# Patient Record
Sex: FEMALE | Race: WHITE | NOT HISPANIC OR LATINO | Employment: UNEMPLOYED | URBAN - METROPOLITAN AREA
[De-identification: names, ages, dates, MRNs, and addresses within clinical notes are randomized per-mention and may not be internally consistent; named-entity substitution may affect disease eponyms.]

---

## 2017-05-23 PROCEDURE — 88305 TISSUE EXAM BY PATHOLOGIST: CPT | Performed by: OBSTETRICS & GYNECOLOGY

## 2017-05-23 PROCEDURE — 88342 IMHCHEM/IMCYTCHM 1ST ANTB: CPT | Performed by: OBSTETRICS & GYNECOLOGY

## 2017-05-24 ENCOUNTER — LAB REQUISITION (OUTPATIENT)
Dept: LAB | Facility: HOSPITAL | Age: 32
End: 2017-05-24
Payer: COMMERCIAL

## 2017-05-24 DIAGNOSIS — R87.610 ATYPICAL SQUAMOUS CELLS OF UNDETERMINED SIGNIFICANCE ON CYTOLOGIC SMEAR OF CERVIX (ASC-US): ICD-10-CM

## 2017-06-05 RX ORDER — SACCHAROMYCES BOULARDII 250 MG
250 CAPSULE ORAL DAILY
Status: ON HOLD | COMMUNITY
End: 2017-06-08 | Stop reason: ALTCHOICE

## 2017-06-05 RX ORDER — IBUPROFEN 200 MG
200 TABLET ORAL EVERY 6 HOURS PRN
COMMUNITY
End: 2018-09-21 | Stop reason: ALTCHOICE

## 2017-06-07 ENCOUNTER — ANESTHESIA EVENT (OUTPATIENT)
Dept: PERIOP | Facility: HOSPITAL | Age: 32
End: 2017-06-07
Payer: COMMERCIAL

## 2017-06-08 ENCOUNTER — ANESTHESIA (OUTPATIENT)
Dept: PERIOP | Facility: HOSPITAL | Age: 32
End: 2017-06-08
Payer: COMMERCIAL

## 2017-06-08 ENCOUNTER — HOSPITAL ENCOUNTER (OUTPATIENT)
Facility: HOSPITAL | Age: 32
Setting detail: OUTPATIENT SURGERY
Discharge: HOME | End: 2017-06-08
Attending: OBSTETRICS & GYNECOLOGY | Admitting: OBSTETRICS & GYNECOLOGY
Payer: COMMERCIAL

## 2017-06-08 VITALS
SYSTOLIC BLOOD PRESSURE: 106 MMHG | HEIGHT: 68 IN | DIASTOLIC BLOOD PRESSURE: 56 MMHG | OXYGEN SATURATION: 100 % | WEIGHT: 137 LBS | RESPIRATION RATE: 16 BRPM | BODY MASS INDEX: 20.76 KG/M2 | HEART RATE: 61 BPM | TEMPERATURE: 97.8 F

## 2017-06-08 DIAGNOSIS — D06.9 CARCINOMA IN SITU OF CERVIX: ICD-10-CM

## 2017-06-08 PROBLEM — N87.1 DYSPLASIA OF CERVIX, HIGH GRADE CIN 2: Status: ACTIVE | Noted: 2017-06-08

## 2017-06-08 LAB
ERYTHROCYTE [DISTWIDTH] IN BLOOD BY AUTOMATED COUNT: 12.8 % (ref 11.6–15.1)
EXT PREGNANCY TEST URINE: NORMAL
HCT VFR BLD AUTO: 37.4 % (ref 34.8–46.1)
HGB BLD-MCNC: 13 G/DL (ref 11.5–15.4)
MCH RBC QN AUTO: 32.8 PG (ref 26.8–34.3)
MCHC RBC AUTO-ENTMCNC: 34.8 G/DL (ref 31.4–37.4)
MCV RBC AUTO: 94 FL (ref 82–98)
PLATELET # BLD AUTO: 212 THOUSANDS/UL (ref 149–390)
PMV BLD AUTO: 11.1 FL (ref 8.9–12.7)
RBC # BLD AUTO: 3.96 MILLION/UL (ref 3.81–5.12)
WBC # BLD AUTO: 7.19 THOUSAND/UL (ref 4.31–10.16)

## 2017-06-08 PROCEDURE — 81025 URINE PREGNANCY TEST: CPT | Performed by: ANESTHESIOLOGY

## 2017-06-08 PROCEDURE — 88307 TISSUE EXAM BY PATHOLOGIST: CPT | Performed by: OBSTETRICS & GYNECOLOGY

## 2017-06-08 PROCEDURE — 85027 COMPLETE CBC AUTOMATED: CPT | Performed by: OBSTETRICS & GYNECOLOGY

## 2017-06-08 PROCEDURE — 88344 IMHCHEM/IMCYTCHM EA MLT ANTB: CPT | Performed by: OBSTETRICS & GYNECOLOGY

## 2017-06-08 PROCEDURE — 88342 IMHCHEM/IMCYTCHM 1ST ANTB: CPT | Performed by: OBSTETRICS & GYNECOLOGY

## 2017-06-08 RX ORDER — ONDANSETRON 2 MG/ML
INJECTION INTRAMUSCULAR; INTRAVENOUS AS NEEDED
Status: DISCONTINUED | OUTPATIENT
Start: 2017-06-08 | End: 2017-06-08 | Stop reason: SURG

## 2017-06-08 RX ORDER — FERRIC SUBSULFATE 0.21 G/G
LIQUID TOPICAL AS NEEDED
Status: DISCONTINUED | OUTPATIENT
Start: 2017-06-08 | End: 2017-06-08 | Stop reason: HOSPADM

## 2017-06-08 RX ORDER — SODIUM CHLORIDE, SODIUM LACTATE, POTASSIUM CHLORIDE, CALCIUM CHLORIDE 600; 310; 30; 20 MG/100ML; MG/100ML; MG/100ML; MG/100ML
125 INJECTION, SOLUTION INTRAVENOUS CONTINUOUS
Status: DISCONTINUED | OUTPATIENT
Start: 2017-06-08 | End: 2017-06-08 | Stop reason: HOSPADM

## 2017-06-08 RX ORDER — OXYCODONE HYDROCHLORIDE AND ACETAMINOPHEN 5; 325 MG/1; MG/1
1 TABLET ORAL EVERY 4 HOURS PRN
Status: DISCONTINUED | OUTPATIENT
Start: 2017-06-08 | End: 2017-06-08 | Stop reason: HOSPADM

## 2017-06-08 RX ORDER — IODINE SOLUTION STRONG 5% (LUGOL'S) 5 %
SOLUTION ORAL AS NEEDED
Status: DISCONTINUED | OUTPATIENT
Start: 2017-06-08 | End: 2017-06-08 | Stop reason: HOSPADM

## 2017-06-08 RX ORDER — PROPOFOL 10 MG/ML
INJECTION, EMULSION INTRAVENOUS AS NEEDED
Status: DISCONTINUED | OUTPATIENT
Start: 2017-06-08 | End: 2017-06-08 | Stop reason: SURG

## 2017-06-08 RX ORDER — MEPERIDINE HYDROCHLORIDE 50 MG/ML
12.5 INJECTION INTRAMUSCULAR; INTRAVENOUS; SUBCUTANEOUS AS NEEDED
Status: DISCONTINUED | OUTPATIENT
Start: 2017-06-08 | End: 2017-06-08 | Stop reason: HOSPADM

## 2017-06-08 RX ORDER — IBUPROFEN 600 MG/1
600 TABLET ORAL EVERY 6 HOURS PRN
Status: DISCONTINUED | OUTPATIENT
Start: 2017-06-08 | End: 2017-06-08 | Stop reason: HOSPADM

## 2017-06-08 RX ORDER — FENTANYL CITRATE 50 UG/ML
INJECTION, SOLUTION INTRAMUSCULAR; INTRAVENOUS AS NEEDED
Status: DISCONTINUED | OUTPATIENT
Start: 2017-06-08 | End: 2017-06-08 | Stop reason: SURG

## 2017-06-08 RX ORDER — MIDAZOLAM HYDROCHLORIDE 1 MG/ML
INJECTION INTRAMUSCULAR; INTRAVENOUS AS NEEDED
Status: DISCONTINUED | OUTPATIENT
Start: 2017-06-08 | End: 2017-06-08 | Stop reason: SURG

## 2017-06-08 RX ORDER — FENTANYL CITRATE/PF 50 MCG/ML
50 SYRINGE (ML) INJECTION
Status: DISCONTINUED | OUTPATIENT
Start: 2017-06-08 | End: 2017-06-08 | Stop reason: HOSPADM

## 2017-06-08 RX ORDER — SODIUM CHLORIDE 9 MG/ML
125 INJECTION, SOLUTION INTRAVENOUS CONTINUOUS
Status: DISCONTINUED | OUTPATIENT
Start: 2017-06-08 | End: 2017-06-08 | Stop reason: HOSPADM

## 2017-06-08 RX ORDER — ONDANSETRON 2 MG/ML
4 INJECTION INTRAMUSCULAR; INTRAVENOUS EVERY 6 HOURS PRN
Status: DISCONTINUED | OUTPATIENT
Start: 2017-06-08 | End: 2017-06-08 | Stop reason: HOSPADM

## 2017-06-08 RX ORDER — ALBUTEROL SULFATE 2.5 MG/3ML
2.5 SOLUTION RESPIRATORY (INHALATION) ONCE AS NEEDED
Status: DISCONTINUED | OUTPATIENT
Start: 2017-06-08 | End: 2017-06-08 | Stop reason: HOSPADM

## 2017-06-08 RX ORDER — OXYCODONE HYDROCHLORIDE AND ACETAMINOPHEN 5; 325 MG/1; MG/1
2 TABLET ORAL EVERY 4 HOURS PRN
Status: DISCONTINUED | OUTPATIENT
Start: 2017-06-08 | End: 2017-06-08 | Stop reason: HOSPADM

## 2017-06-08 RX ADMIN — SODIUM CHLORIDE 125 ML/HR: 0.9 INJECTION, SOLUTION INTRAVENOUS at 09:12

## 2017-06-08 RX ADMIN — ONDANSETRON HYDROCHLORIDE 4 MG: 2 INJECTION, SOLUTION INTRAVENOUS at 08:32

## 2017-06-08 RX ADMIN — FENTANYL CITRATE 50 MCG: 50 INJECTION, SOLUTION INTRAMUSCULAR; INTRAVENOUS at 08:25

## 2017-06-08 RX ADMIN — SODIUM CHLORIDE 125 ML/HR: 0.9 INJECTION, SOLUTION INTRAVENOUS at 07:15

## 2017-06-08 RX ADMIN — PROPOFOL 200 MG: 10 INJECTION, EMULSION INTRAVENOUS at 08:12

## 2017-06-08 RX ADMIN — FENTANYL CITRATE 50 MCG: 50 INJECTION, SOLUTION INTRAMUSCULAR; INTRAVENOUS at 08:12

## 2017-06-08 RX ADMIN — MIDAZOLAM HYDROCHLORIDE 2 MG: 1 INJECTION, SOLUTION INTRAMUSCULAR; INTRAVENOUS at 08:07

## 2017-06-20 ENCOUNTER — APPOINTMENT (OUTPATIENT)
Dept: LAB | Age: 32
End: 2017-06-20
Payer: COMMERCIAL

## 2017-06-20 ENCOUNTER — TRANSCRIBE ORDERS (OUTPATIENT)
Dept: ADMINISTRATIVE | Age: 32
End: 2017-06-20

## 2017-06-20 DIAGNOSIS — Z32.01 PREGNANCY EXAMINATION OR TEST, POSITIVE RESULT: ICD-10-CM

## 2017-06-20 DIAGNOSIS — Z32.01 PREGNANCY EXAMINATION OR TEST, POSITIVE RESULT: Primary | ICD-10-CM

## 2017-06-20 LAB — B-HCG SERPL-ACNC: 42 MIU/ML

## 2017-06-20 PROCEDURE — 86900 BLOOD TYPING SEROLOGIC ABO: CPT | Performed by: OBSTETRICS & GYNECOLOGY

## 2017-06-20 PROCEDURE — 36415 COLL VENOUS BLD VENIPUNCTURE: CPT | Performed by: OBSTETRICS & GYNECOLOGY

## 2017-06-20 PROCEDURE — 86901 BLOOD TYPING SEROLOGIC RH(D): CPT | Performed by: OBSTETRICS & GYNECOLOGY

## 2017-06-20 PROCEDURE — 84702 CHORIONIC GONADOTROPIN TEST: CPT | Performed by: OBSTETRICS & GYNECOLOGY

## 2017-06-21 LAB
ABO GROUP BLD: NORMAL
RH BLD: NEGATIVE

## 2017-06-23 ENCOUNTER — TRANSCRIBE ORDERS (OUTPATIENT)
Dept: ADMINISTRATIVE | Age: 32
End: 2017-06-23

## 2017-06-23 ENCOUNTER — APPOINTMENT (OUTPATIENT)
Dept: LAB | Age: 32
End: 2017-06-23
Payer: COMMERCIAL

## 2017-06-23 DIAGNOSIS — Z32.01 PREGNANCY EXAMINATION OR TEST, POSITIVE RESULT: ICD-10-CM

## 2017-06-23 DIAGNOSIS — Z32.01 PREGNANCY EXAMINATION OR TEST, POSITIVE RESULT: Primary | ICD-10-CM

## 2017-06-23 LAB — B-HCG SERPL-ACNC: 235 MIU/ML

## 2017-06-23 PROCEDURE — 36415 COLL VENOUS BLD VENIPUNCTURE: CPT

## 2017-06-23 PROCEDURE — 84702 CHORIONIC GONADOTROPIN TEST: CPT

## 2017-06-29 ENCOUNTER — APPOINTMENT (OUTPATIENT)
Dept: LAB | Age: 32
End: 2017-06-29
Payer: COMMERCIAL

## 2017-06-29 ENCOUNTER — TRANSCRIBE ORDERS (OUTPATIENT)
Dept: ADMINISTRATIVE | Age: 32
End: 2017-06-29

## 2017-06-29 DIAGNOSIS — Z32.01 PREGNANCY EXAMINATION OR TEST, POSITIVE RESULT: ICD-10-CM

## 2017-06-29 DIAGNOSIS — Z32.01 PREGNANCY EXAMINATION OR TEST, POSITIVE RESULT: Primary | ICD-10-CM

## 2017-06-29 LAB — B-HCG SERPL-ACNC: 3357 MIU/ML

## 2017-06-29 PROCEDURE — 84702 CHORIONIC GONADOTROPIN TEST: CPT

## 2017-06-29 PROCEDURE — 36415 COLL VENOUS BLD VENIPUNCTURE: CPT

## 2017-07-19 ENCOUNTER — GENERIC CONVERSION - ENCOUNTER (OUTPATIENT)
Dept: OTHER | Facility: OTHER | Age: 32
End: 2017-07-19

## 2017-07-27 ENCOUNTER — TRANSCRIBE ORDERS (OUTPATIENT)
Dept: ADMINISTRATIVE | Age: 32
End: 2017-07-27

## 2017-07-27 ENCOUNTER — APPOINTMENT (OUTPATIENT)
Dept: LAB | Age: 32
End: 2017-07-27
Payer: COMMERCIAL

## 2017-07-27 DIAGNOSIS — Z34.91 ENCOUNTER FOR PREGNANCY RELATED EXAMINATION IN FIRST TRIMESTER: ICD-10-CM

## 2017-07-27 DIAGNOSIS — Z34.91 ENCOUNTER FOR PREGNANCY RELATED EXAMINATION IN FIRST TRIMESTER: Primary | ICD-10-CM

## 2017-07-27 LAB
BACTERIA UR QL AUTO: ABNORMAL /HPF
BASOPHILS # BLD AUTO: 0.03 THOUSANDS/ΜL (ref 0–0.1)
BASOPHILS NFR BLD AUTO: 0 % (ref 0–1)
BILIRUB UR QL STRIP: NEGATIVE
CAOX CRY URNS QL MICRO: ABNORMAL /HPF
CLARITY UR: CLEAR
COLOR UR: YELLOW
EOSINOPHIL # BLD AUTO: 0.06 THOUSAND/ΜL (ref 0–0.61)
EOSINOPHIL NFR BLD AUTO: 1 % (ref 0–6)
ERYTHROCYTE [DISTWIDTH] IN BLOOD BY AUTOMATED COUNT: 12.5 % (ref 11.6–15.1)
GLUCOSE SERPL-MCNC: 76 MG/DL (ref 65–140)
GLUCOSE UR STRIP-MCNC: NEGATIVE MG/DL
HCT VFR BLD AUTO: 36.2 % (ref 34.8–46.1)
HGB BLD-MCNC: 12.6 G/DL (ref 11.5–15.4)
HGB UR QL STRIP.AUTO: NEGATIVE
KETONES UR STRIP-MCNC: NEGATIVE MG/DL
LEUKOCYTE ESTERASE UR QL STRIP: ABNORMAL
LYMPHOCYTES # BLD AUTO: 2.41 THOUSANDS/ΜL (ref 0.6–4.47)
LYMPHOCYTES NFR BLD AUTO: 22 % (ref 14–44)
MCH RBC QN AUTO: 31.7 PG (ref 26.8–34.3)
MCHC RBC AUTO-ENTMCNC: 34.8 G/DL (ref 31.4–37.4)
MCV RBC AUTO: 91 FL (ref 82–98)
MONOCYTES # BLD AUTO: 0.69 THOUSAND/ΜL (ref 0.17–1.22)
MONOCYTES NFR BLD AUTO: 6 % (ref 4–12)
NEUTROPHILS # BLD AUTO: 8.01 THOUSANDS/ΜL (ref 1.85–7.62)
NEUTS SEG NFR BLD AUTO: 71 % (ref 43–75)
NITRITE UR QL STRIP: NEGATIVE
NON-SQ EPI CELLS URNS QL MICRO: ABNORMAL /HPF
NRBC BLD AUTO-RTO: 0 /100 WBCS
PH UR STRIP.AUTO: 6.5 [PH] (ref 4.5–8)
PLATELET # BLD AUTO: 238 THOUSANDS/UL (ref 149–390)
PMV BLD AUTO: 11.1 FL (ref 8.9–12.7)
PROT UR STRIP-MCNC: NEGATIVE MG/DL
RBC # BLD AUTO: 3.97 MILLION/UL (ref 3.81–5.12)
RBC #/AREA URNS AUTO: ABNORMAL /HPF
SP GR UR STRIP.AUTO: 1.02 (ref 1–1.03)
UROBILINOGEN UR QL STRIP.AUTO: 1 E.U./DL
WBC # BLD AUTO: 11.21 THOUSAND/UL (ref 4.31–10.16)
WBC #/AREA URNS AUTO: ABNORMAL /HPF

## 2017-07-27 PROCEDURE — 80081 OBSTETRIC PANEL INC HIV TSTG: CPT

## 2017-07-27 PROCEDURE — 81001 URINALYSIS AUTO W/SCOPE: CPT | Performed by: OBSTETRICS & GYNECOLOGY

## 2017-07-27 PROCEDURE — 82947 ASSAY GLUCOSE BLOOD QUANT: CPT | Performed by: OBSTETRICS & GYNECOLOGY

## 2017-07-27 PROCEDURE — 36415 COLL VENOUS BLD VENIPUNCTURE: CPT

## 2017-07-27 PROCEDURE — 86787 VARICELLA-ZOSTER ANTIBODY: CPT

## 2017-07-27 PROCEDURE — 86870 RBC ANTIBODY IDENTIFICATION: CPT | Performed by: OBSTETRICS & GYNECOLOGY

## 2017-07-28 ENCOUNTER — LAB REQUISITION (OUTPATIENT)
Dept: LAB | Facility: HOSPITAL | Age: 32
End: 2017-07-28
Payer: COMMERCIAL

## 2017-07-28 DIAGNOSIS — Z34.81 ENCOUNTER FOR SUPERVISION OF OTHER NORMAL PREGNANCY, FIRST TRIMESTER: ICD-10-CM

## 2017-07-28 LAB
ABO GROUP BLD: NORMAL
BLD GP AB SCN SERPL QL: POSITIVE
BLOOD GROUP ANTIBODIES SERPL: NORMAL
HBV SURFACE AG SER QL: NORMAL
HIV 1+2 AB+HIV1 P24 AG SERPL QL IA: NORMAL
RH BLD: NEGATIVE
RPR SER QL: NORMAL
RUBV IGG SERPL IA-ACNC: 91 IU/ML
SPECIMEN EXPIRATION DATE: NORMAL

## 2017-08-01 LAB — VZV IGG SER IA-ACNC: NORMAL

## 2017-08-25 ENCOUNTER — GENERIC CONVERSION - ENCOUNTER (OUTPATIENT)
Dept: OTHER | Facility: OTHER | Age: 32
End: 2017-08-25

## 2017-08-30 ENCOUNTER — ALLSCRIPTS OFFICE VISIT (OUTPATIENT)
Dept: PERINATAL CARE | Facility: CLINIC | Age: 32
End: 2017-08-30
Payer: COMMERCIAL

## 2017-08-30 PROCEDURE — 99204 OFFICE O/P NEW MOD 45 MIN: CPT | Performed by: GENETIC COUNSELOR, MS

## 2017-08-31 ENCOUNTER — GENERIC CONVERSION - ENCOUNTER (OUTPATIENT)
Dept: OTHER | Facility: OTHER | Age: 32
End: 2017-08-31

## 2017-09-08 ENCOUNTER — ALLSCRIPTS OFFICE VISIT (OUTPATIENT)
Dept: PERINATAL CARE | Facility: CLINIC | Age: 32
End: 2017-09-08
Payer: COMMERCIAL

## 2017-09-08 ENCOUNTER — GENERIC CONVERSION - ENCOUNTER (OUTPATIENT)
Dept: OTHER | Facility: OTHER | Age: 32
End: 2017-09-08

## 2017-09-08 PROCEDURE — 59000 AMNIOCENTESIS DIAGNOSTIC: CPT | Performed by: OBSTETRICS & GYNECOLOGY

## 2017-09-08 PROCEDURE — 76805 OB US >/= 14 WKS SNGL FETUS: CPT | Performed by: OBSTETRICS & GYNECOLOGY

## 2017-09-08 PROCEDURE — 76946 ECHO GUIDE FOR AMNIOCENTESIS: CPT | Performed by: OBSTETRICS & GYNECOLOGY

## 2017-09-19 ENCOUNTER — TRANSCRIBE ORDERS (OUTPATIENT)
Dept: ADMINISTRATIVE | Age: 32
End: 2017-09-19

## 2017-09-19 ENCOUNTER — APPOINTMENT (OUTPATIENT)
Dept: LAB | Age: 32
End: 2017-09-19
Payer: COMMERCIAL

## 2017-09-19 DIAGNOSIS — O09.92 SUPERVISION OF HIGH RISK PREGNANCY IN SECOND TRIMESTER: ICD-10-CM

## 2017-09-19 DIAGNOSIS — O09.92 SUPERVISION OF HIGH RISK PREGNANCY IN SECOND TRIMESTER: Primary | ICD-10-CM

## 2017-09-19 DIAGNOSIS — Z3A.17 17 WEEKS GESTATION OF PREGNANCY: ICD-10-CM

## 2017-09-19 PROCEDURE — 36415 COLL VENOUS BLD VENIPUNCTURE: CPT | Performed by: OBSTETRICS & GYNECOLOGY

## 2017-09-19 PROCEDURE — 84702 CHORIONIC GONADOTROPIN TEST: CPT | Performed by: OBSTETRICS & GYNECOLOGY

## 2017-09-19 PROCEDURE — 82105 ALPHA-FETOPROTEIN SERUM: CPT | Performed by: OBSTETRICS & GYNECOLOGY

## 2017-09-19 PROCEDURE — 86336 INHIBIN A: CPT | Performed by: OBSTETRICS & GYNECOLOGY

## 2017-09-19 PROCEDURE — 82677 ASSAY OF ESTRIOL: CPT | Performed by: OBSTETRICS & GYNECOLOGY

## 2017-09-21 ENCOUNTER — ALLSCRIPTS OFFICE VISIT (OUTPATIENT)
Dept: PERINATAL CARE | Facility: CLINIC | Age: 32
End: 2017-09-21
Payer: COMMERCIAL

## 2017-09-21 ENCOUNTER — GENERIC CONVERSION - ENCOUNTER (OUTPATIENT)
Dept: OTHER | Facility: OTHER | Age: 32
End: 2017-09-21

## 2017-09-21 PROCEDURE — 76946 ECHO GUIDE FOR AMNIOCENTESIS: CPT | Performed by: OBSTETRICS & GYNECOLOGY

## 2017-09-21 PROCEDURE — 59000 AMNIOCENTESIS DIAGNOSTIC: CPT | Performed by: OBSTETRICS & GYNECOLOGY

## 2017-09-22 LAB
2ND TRIMESTER 4 SCREEN SERPL-IMP: NORMAL
AFP ADJ MOM SERPL: 0.74
AFP ADJ MOM SERPL: 0.79
AFP INTERP AMN-IMP: NORMAL
AFP INTERP SERPL-IMP: NORMAL
AFP INTERP SERPL-IMP: NORMAL
AFP SERPL-MCNC: 30.3 NG/ML
AFP SERPL-MCNC: 32.3 NG/ML
AGE AT DELIVERY: 31.3 YEARS
AGE AT DELIVERY: 31.3 YEARS
FET TS 18 RISK FROM MAT AGE: NORMAL
FET TS 21 RISK FROM MAT AGE: 587
GA METHOD: NORMAL
GA METHOD: NORMAL
GA: 17.6 WEEKS
GA: 17.6 WEEKS
HCG ADJ MOM SERPL: 1.29
HCG SERPL-ACNC: NORMAL MIU/ML
IDDM PATIENT QL: NO
IDDM PATIENT QL: NO
INHIBIN A ADJ MOM SERPL: 1.02
INHIBIN A SERPL-MCNC: 178.27 PG/ML
KARYOTYP BLD/T: NORMAL
MULTIPLE PREGNANCY: NO
MULTIPLE PREGNANCY: NO
NEURAL TUBE DEFECT RISK FETUS: NORMAL %
NEURAL TUBE DEFECT RISK FETUS: NORMAL %
SERVICE CMNT-IMP: NORMAL
TS 18 RISK FETUS: NORMAL
TS 21 RISK FETUS: 1536
U ESTRIOL ADJ MOM SERPL: 0.97
U ESTRIOL SERPL-MCNC: 1.18 NG/ML

## 2017-09-27 ENCOUNTER — APPOINTMENT (OUTPATIENT)
Dept: LAB | Facility: HOSPITAL | Age: 32
End: 2017-09-27
Attending: OBSTETRICS & GYNECOLOGY
Payer: COMMERCIAL

## 2017-09-27 ENCOUNTER — TRANSCRIBE ORDERS (OUTPATIENT)
Dept: LAB | Facility: HOSPITAL | Age: 32
End: 2017-09-27

## 2017-09-27 ENCOUNTER — GENERIC CONVERSION - ENCOUNTER (OUTPATIENT)
Dept: OTHER | Facility: OTHER | Age: 32
End: 2017-09-27

## 2017-09-27 DIAGNOSIS — Z13.79 GENETIC SCREENING: ICD-10-CM

## 2017-09-27 DIAGNOSIS — Z13.79 GENETIC SCREENING: Primary | ICD-10-CM

## 2017-09-27 PROCEDURE — 36415 COLL VENOUS BLD VENIPUNCTURE: CPT

## 2017-10-11 ENCOUNTER — GENERIC CONVERSION - ENCOUNTER (OUTPATIENT)
Dept: OTHER | Facility: OTHER | Age: 32
End: 2017-10-11

## 2017-10-11 ENCOUNTER — APPOINTMENT (OUTPATIENT)
Dept: PERINATAL CARE | Facility: CLINIC | Age: 32
End: 2017-10-11
Payer: COMMERCIAL

## 2017-10-11 PROCEDURE — 76811 OB US DETAILED SNGL FETUS: CPT | Performed by: OBSTETRICS & GYNECOLOGY

## 2017-10-11 PROCEDURE — 76817 TRANSVAGINAL US OBSTETRIC: CPT | Performed by: OBSTETRICS & GYNECOLOGY

## 2017-11-01 ENCOUNTER — GENERIC CONVERSION - ENCOUNTER (OUTPATIENT)
Dept: OTHER | Facility: OTHER | Age: 32
End: 2017-11-01

## 2017-11-01 ENCOUNTER — APPOINTMENT (OUTPATIENT)
Dept: PERINATAL CARE | Facility: CLINIC | Age: 32
End: 2017-11-01
Payer: COMMERCIAL

## 2017-11-01 PROCEDURE — 93325 DOPPLER ECHO COLOR FLOW MAPG: CPT | Performed by: OBSTETRICS & GYNECOLOGY

## 2017-11-01 PROCEDURE — 76827 ECHO EXAM OF FETAL HEART: CPT | Performed by: OBSTETRICS & GYNECOLOGY

## 2017-11-01 PROCEDURE — 76825 ECHO EXAM OF FETAL HEART: CPT | Performed by: OBSTETRICS & GYNECOLOGY

## 2017-12-05 ENCOUNTER — TRANSCRIBE ORDERS (OUTPATIENT)
Dept: ADMINISTRATIVE | Age: 32
End: 2017-12-05

## 2017-12-05 ENCOUNTER — APPOINTMENT (OUTPATIENT)
Dept: LAB | Age: 32
End: 2017-12-05
Payer: COMMERCIAL

## 2017-12-05 DIAGNOSIS — O09.90 HIGH RISK PREGNANCY, ANTEPARTUM: Primary | ICD-10-CM

## 2017-12-05 DIAGNOSIS — O09.90 HIGH RISK PREGNANCY, ANTEPARTUM: ICD-10-CM

## 2017-12-05 DIAGNOSIS — Z67.91 RHD NEGATIVE: ICD-10-CM

## 2017-12-05 LAB
BASOPHILS # BLD AUTO: 0.02 THOUSANDS/ΜL (ref 0–0.1)
BASOPHILS NFR BLD AUTO: 0 % (ref 0–1)
BLD GP AB SCN SERPL QL: NEGATIVE
EOSINOPHIL # BLD AUTO: 0.06 THOUSAND/ΜL (ref 0–0.61)
EOSINOPHIL NFR BLD AUTO: 1 % (ref 0–6)
ERYTHROCYTE [DISTWIDTH] IN BLOOD BY AUTOMATED COUNT: 12.9 % (ref 11.6–15.1)
GLUCOSE 1H P 50 G GLC PO SERPL-MCNC: 103 MG/DL
HCT VFR BLD AUTO: 32.2 % (ref 34.8–46.1)
HGB BLD-MCNC: 11 G/DL (ref 11.5–15.4)
LYMPHOCYTES # BLD AUTO: 2.1 THOUSANDS/ΜL (ref 0.6–4.47)
LYMPHOCYTES NFR BLD AUTO: 20 % (ref 14–44)
MCH RBC QN AUTO: 32 PG (ref 26.8–34.3)
MCHC RBC AUTO-ENTMCNC: 34.2 G/DL (ref 31.4–37.4)
MCV RBC AUTO: 94 FL (ref 82–98)
MONOCYTES # BLD AUTO: 0.75 THOUSAND/ΜL (ref 0.17–1.22)
MONOCYTES NFR BLD AUTO: 7 % (ref 4–12)
NEUTROPHILS # BLD AUTO: 7.43 THOUSANDS/ΜL (ref 1.85–7.62)
NEUTS SEG NFR BLD AUTO: 72 % (ref 43–75)
NRBC BLD AUTO-RTO: 0 /100 WBCS
PLATELET # BLD AUTO: 252 THOUSANDS/UL (ref 149–390)
PMV BLD AUTO: 10.8 FL (ref 8.9–12.7)
RBC # BLD AUTO: 3.44 MILLION/UL (ref 3.81–5.12)
WBC # BLD AUTO: 10.41 THOUSAND/UL (ref 4.31–10.16)

## 2017-12-05 PROCEDURE — 36415 COLL VENOUS BLD VENIPUNCTURE: CPT

## 2017-12-05 PROCEDURE — 82950 GLUCOSE TEST: CPT

## 2017-12-05 PROCEDURE — 86850 RBC ANTIBODY SCREEN: CPT

## 2017-12-05 PROCEDURE — 86592 SYPHILIS TEST NON-TREP QUAL: CPT

## 2017-12-05 PROCEDURE — 85025 COMPLETE CBC W/AUTO DIFF WBC: CPT

## 2017-12-06 LAB — RPR SER QL: NORMAL

## 2018-01-09 NOTE — MISCELLANEOUS
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

## 2018-01-12 NOTE — PROGRESS NOTES
SEP 21 2017         RE: Rodríguez Ayon                                 To: [facility]   MR#: [id number]                                    [address]   : [address]  [facility] [address]: [id number]:ILDJHEMANT Mantilla, [address]   (Exam #: [id number])                           Fax: [phone]      The LMP of this 28year old,  G3, P1-0-1-* patient was MAY 19 2017, giving   her an ABDULLAHI of 2018 and a current gestational age of 12 weeks 6 days   by dates  A sonographic examination was performed on SEP 21 2017 using   real time equipment  The ultrasound examination was performed using   abdominal technique  The patient has a BMI of 22 3  Her blood pressure   today was 102/64  Cardiac motion was observed at 143 bpm       INDICATIONS      long  interval pregnancy   family history of fragile x   maternal tachycardia   history of maternal cardiac anomaly   prior LEEP      Exam Types      amniocentesis      RESULTS      Fetus # 1 of 1   Variable presentation   Placenta Location = Anterior   No placenta previa      AMNIOTIC FLUID         Largest Vertical Pocket = 6 0 cm   Amniotic Fluid: Normal      PROCEDURES      Amniocentesis (Genetic) was performed by MELISSA Davis  under   ultrasound guidance  On the first attempt 30 cc of clear fluid were   obtained  The fluid was sent for AFP, Genetic and Other  The 20 gauge   needle did not traverse the placenta  There were no complications during   this procedure  Received two doses of rhogam thus far in pregnancy  Post procedure fetal heart movement was documented  The patient is Rh Negative, and Rhogam was not administered        IMPRESSION      Darden IUP   Variable presentation   Regular fetal heart rate of 143 bpm   Anterior placenta   No placenta previa   Successful amniocentesis      GENERAL COMMENT      On exam, the patient appears well, in no acute distress, and her abdomen   is nontender  Thank you for referring your patient to our Center for amniocentesis for   known maternal translocation and fragile X carrier status  The amniotic   fluid volume is normal   The placenta is anterior and appears   sonographically normal   Good fetal movement and tone are appreciated   today  After informed consent was obtained, amniocentesis was performed   without difficulty (see comments above)  Amniotic fluid was sent for   karyotype  Fetal heart motion was demonstrated pre and post procedure  Precautions were reviewed  The patient was informed of today's findings   and all of her questions were answered  The limitations of ultrasound   were reviewed with the patient, which she accepts  We discussed follow-up in detail and amylase currently scheduled to return   at 20 weeks for a detailed fetal anatomic evaluation as well as a fetal   echocardiogram        Thank you very much for allowing us to participate in the care of this   very nice patient  Should you have any questions, please do not hesitate   to contact our office  Please note, in addition to the time spent discussing the results of the   ultrasound, I spent approximately 10 minutes of face-to-face time with the   patient, greater than 50% of which was spent in counseling and the   coordination of care for this patient  Almyra Sandifer, R D M S Janean Mam, M D     Electronically signed 09/21/17 13:10

## 2018-01-13 VITALS
BODY MASS INDEX: 22.28 KG/M2 | SYSTOLIC BLOOD PRESSURE: 102 MMHG | HEIGHT: 68 IN | WEIGHT: 147 LBS | DIASTOLIC BLOOD PRESSURE: 64 MMHG

## 2018-01-13 NOTE — PROGRESS NOTES
2017         RE: Sheila Gay                                 To: [facility]   MR#: [id number]                                    [address]   : [address] [place] [address]: [id number]:INDER Mantilla, [address]   (Exam #: [id number])                           Fax: [phone]      The LMP of this 28year old,  G3, P1-0-1-0 patient was MAY 19 2017, giving   her an ABDULLAHI of 2018 and a current gestational age of 20 weeks 5 days   by dates  A sonographic examination was performed on 2017 using real   time equipment  The ultrasound examination was performed using abdominal   technique  The patient has a BMI of 23 6  Her blood pressure today was   109/56        Cardiac motion was observed at 140 bpm       INDICATIONS      long  interval pregnancy   family history of fragile x   maternal tachycardia   history of maternal cardiac anomaly      Exam Types      Fetal Echocardiogram      RESULTS      Fetus # 1 of 1   Vertex presentation   Placenta Location = Anterior   No placenta previa   Placenta Grade = I      AMNIOTIC FLUID         Largest Vertical Pocket = 4 3 cm   Amniotic Fluid: Normal      FETAL VESSELS                                     S/D   PI    RI    PSV   AEDV RF                                                    cm/s       Umbilical Artery           3 75  1 24  0 73        No   No       Middle Cerebral Artery     5 59  1 72  0 82  26 51 No      FETAL VESSELS                                    PVSys PVDia PASys  S/D   S/A   DVI   RF       Ductus Venosus:                                                No      FETAL ECHOCARDIOGRAPHY      Visceral/abdominal situs                Normal   4 chamber view apical                   Normal   4 chamber view subcostal                Normal   Atrial septum                           Normal   Ventricular septum                      Normal   LVOT Normal   RVOT                                    Normal   3 Vessel-trachea view                   Normal   3 Vessel view                           Normal   Short Axis ventricles                   Normal   Short axis outflows                     Normal   Aortic Arch                             Normal   Ductal Arch                             Normal   Superior vena cava                      Normal   Inferior vena cava                      Normal   Pulmonary veins                         Normal   Foramen ovale                           Normal   Mitral valve                            Normal   Tricuspid valve                         Normal   Aortic valve                            Normal   Pulmonary valve                         Normal   M-mode/rhythm                           Normal   Umbilical artery                        Normal   Ductus venosus                          Normal   Umbilical vein                          Normal      FETAL ECHO      The  atrial to ventricular ratio was one to one with a rate within the   normal range of 110-160 beats per minute  The views of at least 2   pulmonary veins and the flow across the fossa ovale is from right to left   which is normal  Pulsed Doppler demonstrated normal waveform features and   the flow velocities across the mitral 52 89 cm/sec , tricuspid 52 89   cm/sec, aortic 60 30 cm/sec, aortic arch 78 04  cm/sec, pulmonary 44 80   cm/sec and ductus arteriosus 63 53  cm/sec appear normal  The   cardiothoracic area appears normal at <33 % or less  Color doppler study   demonstrated unidirectional flow across the tricuspid and mitral valves   with no evidence of regurgitation  There is no evidence of any pericardial   effusion, pleural effusion, ascites or fetal arrhythmia  The myocardial   performance appeared adequate  Umbilical dopplers are normal for   gestational age  Middle cerebral artery Dopplers are normal for   gestational age        The ability to view cardiac structures is dependant upon the image   quality, which varies with fetal position, gestational age and maternal   habitus  For example,  ultrasound may not detect small septal   defects and minor valvular abnormalities  Other examples of difficult    diagnosis include post valvular stenosis, coarctation of the   aorta, persistent patency of the ductus arteriosus and fossa ovale and   anomalous pulmonary venous return  IMPRESSION:  Normal fetal echocardiogram       IMPRESSION      Darden IUP   Vertex presentation   Fetal heart rate of 140 bpm   Anterior placenta   No placenta previa      GENERAL COMMENT      On exam, the patient appears well, in no acute distress, and her abdomen   is nontender  Adrianne's fragile X testing on the amnio returned as negative  We discussed follow-up in detail and I recommend  return as   clinically indicated  DANISH Mora , MELISSA Lisa     Electronically signed 17 16:01

## 2018-01-13 NOTE — PROGRESS NOTES
OCT 11 2017         RE: Trell Mcclain                                 To: [facility]   MR#: [id number]                                    [address]   : [address], [address]   (Exam #: [id number])                           Fax: [phone]      The LMP of this 28year old,  G3, P1-0-1-0 patient was MAY 19 2017, giving   her an ABDULLAHI of 2018 and a current gestational age of 25 weeks 5 days   by dates  A sonographic examination was performed on OCT 11 2017 using   real time equipment  The ultrasound examination was performed using   abdominal & vaginal techniques  The patient has a BMI of 23 0  Her blood   pressure today was 120/60  Cardiac motion was observed at 148 bpm       INDICATIONS      long  interval pregnancy   family history of fragile x   maternal tachycardia   history of maternal cardiac anomaly   prior LEEP   fetal anatomical survey      Exam Types      Transvaginal   LEVEL II      RESULTS      Fetus # 1 of 1   Breech presentation   Fetal growth appeared normal   Placenta Location = Anterior   No placenta previa      MEASUREMENTS (* Included In Average GA)      AC              15 7 cm        20 weeks 4 days* (48%)   BPD              4 6 cm        19 weeks 6 days* (27%)   HC              17 7 cm        20 weeks 1 day * (30%)   Femur            3 4 cm        20 weeks 5 days* (43%)      Nuchal Fold      3 8 mm   NBL              5 9 mm      Humerus          3 2 cm        20 weeks 4 days  (47%)      Cerebellum       2 1 cm        20 weeks 3 days   Biorbit          3 3 cm        20 weeks 6 days   CisternaMagna    6 1 mm      HC/AC           1 12   FL/AC           0 21   FL/BPD          0 73   EFW (Ac/Fl/Hc)   364 grams - 0 lbs 13 oz      THE AVERAGE GESTATIONAL AGE is 20 weeks 2 days +/- 10 days        AMNIOTIC FLUID         Largest Vertical Pocket = 6 3 cm   Amniotic Fluid: Normal      CERVICAL EVALUATION      SUPINE      Cervical Length: 3 20 cm      OTHER TEST RESULTS           Funneling?: No             Dynamic Changes?: No        Resp  To TFP?: No      ANATOMY      Head                                    Normal   Face/Neck                               Normal   Th  Cav  Normal   Heart                                   Normal   Abd  Cav  Normal   Stomach                                 Normal   Right Kidney                            Normal   Left Kidney                             Normal   Bladder                                 Normal   Abd  Wall                               Normal   Spine                                   Normal   Extrems                                 Normal   Genitalia                               Normal   Placenta                                Normal   Umbl  Cord                              Normal   Uterus                                  Normal   PCI                                     Normal      ANATOMY DETAILS      Visualized Appearing Sonographically Normal:   HEAD: (Calvarium, BPD Level, Cavum, Lateral Ventricles, Choroid Plexus,   Cerebellum, Cisterna Magna);    FACE/NECK: (Neck, Nuchal Fold, Profile,   Orbits, Nose/Lips, Palate, Face);    TH  CAV  : (Lungs, Diaphragm); HEART: (Four Chamber View, Proximal Left Outflow, Proximal Right Outflow,   3VV, 3 Vessel Trachea, Short Axis of Greater Vessels, Ductal Arch, Aortic   Arch, Interventricular Septum, Interatrial Septum, IVC, SVC, Cardiac Axis,   Cardiac Position);    ABD  CAV : (Liver);    STOMACH, RIGHT KIDNEY, LEFT   KIDNEY, BLADDER, ABD  WALL, SPINE: (Cervical Spine, Thoracic Spine, Lumbar   Spine, Sacrum);    EXTREMS: (Lt Humerus, Rt Humerus, Lt Forearm, Rt   Forearm, Lt Hand, Rt Hand, Lt Femur, Rt Femur, Lt Low Leg, Rt Low Leg, Lt   Foot, Rt Foot);    GENITALIA, PLACENTA, UMBL   CORD, UTERUS, PCI      ADNEXA      The left ovary appeared normal and measured 3 0 x 3 5 x 1 8 cm with a   volume of 9 9 cc  The right ovary was not visualized  IMPRESSION      Darden IUP   20 weeks and 2 days by this ultrasound  (ABDULLAHI=FEB 26 2018)   Breech presentation   Fetal growth appeared normal   Normal anatomy survey   Regular fetal heart rate of 148 bpm   Anterior placenta   No placenta previa      GENERAL COMMENT      On exam today the patient appears well, in no acute distress, and denies   any complaints  Her abdomen is non-tender  Alayna Bridges underwent an amniocentesis with normal chromosomes and MicroArray  We continue to await the evaluation for fragile X which should be resulted   within the next week or so  The fetal anatomic survey is complete  There is no sonographic evidence   of fetal abnormalities at this time  Good fetal movement and tone are   seen  The amniotic fluid volume appears normal   The placenta is anterior   and it appears sonographically normal   A transvaginal ultrasound was   performed to assess the cervix, which was not seen well transabdominally  The cervical length was 3 2 centimeters, which is normal for the current   gestational age  There was no significant funneling or dynamic changes   appreciated  The patient was informed of today's findings and all of her   questions were answered  The limitations of ultrasound were reviewed with   the patient, which she accepts  We discussed follow-up in detail and Alayna Bridges is currently scheduled for a   fetal echocardiogram in 2-3 weeks  She was counseled regarding and declined to receive a flu vaccine today   but will consider it in the near future         Please note, in addition to the time spent discussing the results of the   ultrasound, I spent approximately 10 minutes of face-to-face time with the   patient, greater than 50% of which was spent in counseling and the   coordination of care for this patient        Thank you very much for allowing us to participate in the care of this   very nice patient  Should you have any questions, please do not hesitate   to contact our office  DANISH Ozuna M D     Electronically signed 10/12/17 09:07

## 2018-01-14 VITALS
SYSTOLIC BLOOD PRESSURE: 99 MMHG | HEIGHT: 68 IN | DIASTOLIC BLOOD PRESSURE: 49 MMHG | WEIGHT: 147 LBS | BODY MASS INDEX: 22.28 KG/M2

## 2018-01-15 NOTE — PROGRESS NOTES
Acute Care - Physical Therapy Treatment Note/Discharge  [facility]     Patient Name: Juila Blackmon  : 1937  MRN: [id number]  Today's Date: 2017  Onset of Illness/Injury or Date of Surgery Date: 17  Date of Referral to PT: 17  Referring Physician: Dr. Ramos    Admit Date: 2017    Visit Dx:    ICD-10-CM ICD-9-CM   1. Incomplete tear of right rotator cuff M75.111 840.4   2. Impaired functional mobility, balance, gait, and endurance Z74.09 V49.89   3. Impaired mobility and ADLs Z74.09 799.89   4. S/P arthroscopy of shoulder Z98.890 V45.89     Patient Active Problem List   Diagnosis   • Well woman exam with routine gynecological exam   • Incomplete rotator cuff tear   • Elevated blood pressure reading   • Chronic prescription opiate use   • Depression   • GERD (gastroesophageal reflux disease)   • Controlled type 2 diabetes mellitus without complication, without long-term current use of insulin   • Chronic constipation   • Chronic back pain       Physical Therapy Education     Title: PT OT SLP Therapies (Active)     Topic: Physical Therapy (Done)     Point: Mobility training (Done)    Learning Progress Summary    Learner Readiness Method Response Comment Documented by Status   Patient Acceptance RAYMOND CURRAN NR   17 0841 Done    Acceptance RAYMOND CURRAN NR Reviewed benefits of mobility  17 1330 Done   Family Acceptance RAYMOND CURRAN NR   17 0841 Done               Point: Home exercise program (Done)    Learning Progress Summary    Learner Readiness Method Response Comment Documented by Status   Patient Acceptance RAYMOND CURRAN NR   17 0841 Done   Family Acceptance RAYMOND CURRAN NR   17 0841 Done               Point: Body mechanics (Done)    Learning Progress Summary    Learner Readiness Method Response Comment Documented by Status   Patient Acceptance RAYMOND CURRAN NR   17 0841 Done    Acceptance RAYMOND CURRAN NR Reviewed benefits of mobility  17 1330 Done   Family Acceptance RAYMOND CURRAN  SEP 8 2017         RE: Juaquin Bo                                 To: [facility]   MR#: [id number]                                    [address]   : [id number] [place]: [phone]:NINFAARRONRUBINA Mantilla, [address]   (Exam #: [id number])                           Fax: [phone]      The LMP of this 28year old,  G3, P1-0-1-* patient was MAY 19 2017, giving   her an ABDULLAHI of 2018 and a current gestational age of 12 weeks 0 days   by dates  A sonographic examination was performed on SEP 8 2017 using real   time equipment  The ultrasound examination was performed using abdominal   technique  The patient has a BMI of 22 3  Her blood pressure today was   99/49  Multiple longitudinal and transverse sections revealed a howard   intrauterine pregnancy with the fetus in breech presentation  The placenta   is anterior in implantation, grade I in appearance, and there is no   placenta previa        Cardiac motion was observed at 146 bpm       INDICATIONS      early fetal anatomy and growth   long  interval pregnancy   family history of fragile x   maternal tachycardia   history of maternal cardiac anomaly   prior LEEP      Exam Types      Level I   amniocentesis      RESULTS      Fetus # 1 of 1   Breech presentation   Fetal growth appeared normal      MEASUREMENTS (* Included In Average GA)      AC              10 2 cm        15 weeks 6 days* (55%)   BPD              3 2 cm        16 weeks 1 day *   HC              12 4 cm        16 weeks 1 day *   Femur            1 6 cm        14 weeks 4 days*      Humerus          1 6 cm        14 weeks 4 days  (17%)   Radius           1 6 cm        16 weeks 4 days   Ulna             1 4 cm        14 weeks 6 days   Tibia            1 4 cm        14 weeks 6 days  (8%)   Fibula           1 4 cm        14 weeks 4 days      Cerebellum       1 4 cm        15 weeks 4 days      HC/AC 1 21   FL/AC           0 16   FL/BPD          0 50   EFW (Ac/Fl/Hc)   128 grams - 0 lbs 5 oz      THE AVERAGE GESTATIONAL AGE is 15 weeks 5 days +/- 7 days  ANATOMY SUMMARY      The fetal cranium appeared normal in shape  Choroid plexus cysts are not   present  The lateral ventricles were not dilated and the midline   structures were not deviated  The cerebellum and cisterna magna were   visualized and appeared normal  The nuchal fold is not abnormally   thickened, measured at  mm  The calvarium showed no evidence of defect,   scalloping of the parietal bones or abnormal shape  The cavum septum   pellucidum appears normal  The fetal face appears normal  There is no   evidence of facial clefting, hypotelorism, hypertelorism or micrognathia  A normal appearing nasal bone measuring  mm was identified in the sagittal   profile view  3-D views of the face appeared normal  Anatomy of the fetal   thorax appeared within normal limits  The fetal diaphragm appears intact  There were no intrathoracic masses noted or evidence of   pleural/pericardial effusion  The cardiac arch views appeared normal     There was no suspicion of tricuspid regurgitation  The 4 chamber view was   suboptimally visualized  The proximal left outflow was suboptimally   visualized  The proximal right outflow was suboptimally visualized  The   distal pulmonary artery to ductal arch was visualized  The distal aorta to   aortic arch was suboptimally visualized  The interventricular septum was   visualized  The interatrial septum was visualized  The inferior vena cava   was not evaluated  The superior vena cava was not evaluated  The cardiac   axis appeared normal  The cardiac position appeared normal  The abdominal   cavity appears normal  There is no evidence of fetal bowel obstruction or   abnormally echogenic bowel  Ascites is not present  The fetal stomach   appears normal in size and shape   The right kidney appears normal  There   is no NABOR HAN   11/09/17 0841 Done               Point: Precautions (Done)    Learning Progress Summary    Learner Readiness Method Response Comment Documented by Status   Patient Acceptance RAYMOND CURRAN NR   11/09/17 0841 Done    Acceptance RAYMOND CURRAN NR Reviewed benefits of mobility  11/08/17 1330 Done   Family Acceptance RAYMOND CURRAN NR   11/09/17 0841 Done                      User Key     Initials Effective Dates Name Provider Type Discipline     03/14/16 -  Florin Hidalgo, PT Physical Therapist PT                    IP PT Goals       11/09/17 0841 11/08/17 1332       Bed Mobility PT LTG    Bed Mobility PT LTG, Date Established  11/08/17  -     Bed Mobility PT LTG, Time to Achieve  5 days  -MJ     Bed Mobility PT LTG, Activity Type  supine to sit/sit to supine  -MJ     Bed Mobility PT LTG, [place] Level  contact guard assist  -MJ     Bed Mobility PT LTG, Date Goal Reviewed 11/09/17  - 11/08/17  -     Bed Mobility PT LTG, Outcome goal not met  -MJ goal ongoing  -MJ     Bed Mobility PT LTG, Reason Goal Not Met discharged from facility  -MJ      Transfer Training PT LTG    Transfer Training PT LTG, Date Established  11/08/17  -MJ     Transfer Training PT LTG, Time to Achieve  5 days  -MJ     Transfer Training PT LTG, Activity Type  sit to stand/stand to sit  -MJ     Transfer Training PT LTG, [place] Level  conditional independence  -MJ     Transfer Training PT LTG, Assist Device  cane, straight  -MJ     Transfer Training PT  LTG, Date Goal Reviewed 11/09/17  - 11/08/17  -MJ     Transfer Training PT LTG, Outcome goal not met  -MJ goal ongoing  -MJ     Transfer Training PT LTG, Reason Goal Not Met discharged from facility  -MJ      Gait Training PT LTG    Gait Training Goal PT LTG, Date Established  11/08/17  -MJ     Gait Training Goal PT LTG, Time to Achieve  5 days  -MJ     Gait Training Goal PT LTG, [place] Level  supervision required  -MJ     Gait Training Goal PT LTG, Assist Device  cane, straight  -MJ      Gait Training Goal PT LTG, Distance to Achieve  150 feet  -MJ     Gait Training Goal PT LTG, Date Goal Reviewed 11/09/17  -MJ 11/08/17  -MJ     Gait Training Goal PT LTG, Outcome goal not met  -MJ goal ongoing  -MJ     Gait Training Goal PT LTG, Reason Goal Not Met discharged from facility  -MJ      Stair Training PT LTG    Stair Training Goal PT LTG, Date Established  11/08/17  -MJ     Stair Training Goal PT LTG, Time to Achieve  5 days  -MJ     Stair Training Goal PT LTG, Number of Steps  4  -MJ     Stair Training Goal PT LTG, [place] Level  contact guard assist  -MJ     Stair Training Goal PT LTG, Assist Device  1 handrail  -MJ     Stair Training Goal PT LTG, Date Goal Reviewed 11/09/17  -MJ 11/08/17  -MJ     Stair Training Goal PT LTG, Outcome goal met  -MJ goal ongoing  -MJ       User Key  (r) = Recorded By, (t) = Taken By, (c) = Cosigned By    Initials Name Provider Type     Florin Hidalgo, PT Physical Therapist              Adult Rehabilitation Note       11/09/17 0820          Rehab Assessment/Intervention    Discipline physical therapist  -      Document Type therapy note (daily note);discharge summary  -      Subjective Information agree to therapy;complains of;pain  -MJ      Patient Effort, Rehab Treatment good  -      Symptoms Noted During/After Treatment fatigue  -MJ      Precautions/Limitations fall precautions;shoulder precautions;non-weight bearing status   interscalene nerve catehter  -MJ      Recorded by [MJ] Florin Hidalgo, LANDEN      Pain Assessment    Pain Assessment 0-10  -MJ      Pain Score 5  -MJ      Post Pain Score 5  -MJ      Pain Type Acute pain  -MJ      Pain Location Shoulder  -MJ      Pain Orientation Right  -MJ      Pain Intervention(s) Repositioned;Ambulation/increased activity  -MJ      Recorded by [HANH] Florin Hidalgo, PT      Cognitive Assessment/Intervention    Current Cognitive/Communication Assessment functional  -MJ      Orientation Status oriented x 4  -MJ      Follows  suspicion of pyelectasis  Renal cysts are not present  The   echogenicity of the kidney is normal  The left kidney appears normal     There is no suspicion of pyelectasis  The echogenicity of the kidney is   normal   renal cysts are not present  The fetal bladder appears normal in   size and shape  There is no suspicion of ureterocele  The abdominal wall   appears intact  A normal abdominal cord insertion is noted  The cervical   spine was visualized  The thoracic spine was visualized  The lumbar spine   was visualized  The sacrum was visualized  The left humerus appeared   normal  The right humerus appeared normal  The left forearm appeared   normal  The right forearm appeared normal  The left hand was visualized  The right hand was visualized  The left femur appeared normal  The right   femur appeared normal  The lower left leg appeared normal  The lower right   leg appeared normal  The left foot was visualized  The right foot was   visualized  The genitalia appeared normal  The placenta appears normal    There is no evidence of advanced placental maturation, placental   abruption, intervillous thrombosis, placental infarction or multiple   venous lakes  There is a 3 vessel cord with normal insertion site  A   normal amount of umbilical vascular coiling is noted  The uterine contour   appears normal  There is no suspicion of a uterine myoma  ADNEXA      The left ovary appeared normal and measured 2 9 x 2 2 x 2 2 cm with a   volume of 7 3 cc  The right ovary appeared normal and measured 2 5 x 1 7 x   2 0 cm with a volume of 4 4 cc  Both tubes appeared normal       PROCEDURES      Amniocentesis was performed by MELISSA Odell  under ultrasound   guidance  On the first attempt no fluid were obtained  The 22 gauge needle   did not traverse the placenta  There were complications during this   procedure  Significant tenting of membranes preventing entrance into amniotic cavity      Unsuccessful Commands/Answers Questions 100% of the time;able to follow multi-step instructions;needs cueing  -      Personal Safety mild impairment  -      Recorded by [MJ] Florin Hidalgo, PT      Mobility Assessment/Training    Extremity Weight-Bearing Status right upper extremity  -      Right Upper Extremity Weight-Bearing non weight-bearing  -MJ      Recorded by [MJ] Florin Hidalgo, PT      Bed Mobility, Assessment/Treatment    Bed Mobility, Assistive Device bed rails;head of bed elevated  -      Bed Mob, Supine to Sit, [place] not tested   Pt sitting EOB  -MJ      Bed Mob, Sit to Supine, [place] not tested   Pt sitting EOB  -MJ      Recorded by [MJ] Florin Hidalgo, PT      Transfer Assessment/Treatment    Transfers, Sit-Stand [place] contact guard assist;verbal cues required  -      Transfers, Stand-Sit [place] contact guard assist;verbal cues required  -      Transfer, Safety Issues step length decreased;weight-shifting ability decreased  -      Transfer, Impairments strength decreased;impaired balance;pain  -      Transfer, Comment Verbal cues for hand placement  -      Recorded by [MJ] Florin Hidalgo, PT      Gait Assessment/Treatment    Gait, [place] Level contact guard assist;verbal cues required  -      Gait, Assistive Device straight cane  -      Gait, Distance (Feet) 140  -      Gait, Gait Pattern Analysis swing-through gait  -      Gait, Gait Deviations melonie decreased;step length decreased;weight-shifting ability decreased  -      Gait, Safety Issues step length decreased;weight-shifting ability decreased  -      Gait, Impairments strength decreased;pain  -      Gait, Comment Pt demo step through gait pattern at slow pace. Verbal cues for sequencing with cane. Gait limited by fatigue  -      Recorded by [MJ] Florin Hidalgo, PT      Stairs Assessment/Treatment    Number of Stairs 5  -      Stairs, Handrail Location left side (ascending)  -      Stairs, [place]  amniocentesis  Post procedure fetal heart movement was documented  The patient is Rh Negative, and Rhogam was administered  AMNIOTIC FLUID         Largest Vertical Pocket = 4 9 cm   Amniotic Fluid: Normal      IMPRESSION      [facility] IUP   15 weeks and 5 days by this ultrasound  (ABDULLAHI=FEB 25 2018)   Breech presentation   Fetal growth appeared normal   Regular fetal heart rate of 146 bpm   Anterior placenta   No placenta previa   Unsuccessful amniocentesis      GENERAL Henrique Rocha presented today for an amniocentesis for the 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  She met with   Ld Herrera from Genetic counseling  Please see separate genetic   counseling note  Unfortunately, there was significant tenting of the membranes on multiple   attempts with a single needle to penetrate the amnion  Given her   gestational age and this tenting, I did not want increase the risk further   of complications from the procedure and therefore the procedure was   aborted  The patient was informed of today's findings and suggestion to   return in 2 weeks to reattempt an amniocentesis at that time  She was   instructed on precautions related to an amniocentesis and I advised her to   call with any concerns related to bleeding or signs or symptoms of   infection  Today's early anatomy is otherwise reassuring without significant fetal   abnormalities appreciated or suspected in the study that is otherwise   limited by early gestational age and fetal position  A complete detailed   fetal anatomic evaluation is recommended at around 20 weeks gestation  Thank you very much for allowing us to participate in the care of this   very nice patient  Should you have any questions, please do not hesitate   to contact our office        Please note, in addition to the time spent discussing the results of the   ultrasound, I spent approximately 10 minutes of Level contact guard assist;verbal cues required  -MJ      Stairs, Technique Used step to step (ascending);step to step (descending)  -MJ      Stairs, Safety Issues sequencing ability decreased;weight-shifting ability decreased  -MJ      Stairs, Impairments ROM decreased;strength decreased;pain  -MJ      Stairs, Comment Pt performed non-reciprocally. Verbal cues for sequencing  -MJ      Recorded by [HANH] Florin Hidalgo PT      Positioning and Restraints    Pre-Treatment Position in bed  -MJ      Post Treatment Position bed  -MJ      In Bed notified nsg;sitting EOB;call light within reach;encouraged to call for assist;with family/caregiver;with OT  -MJ      Recorded by [MJ] Florin Hidalgo PT        User Key  (r) = Recorded By, (t) = Taken By, (c) = Cosigned By    Initials Name Effective Dates    HANH Hidalgo PT 03/14/16 -           PT Recommendation and Plan  Anticipated Discharge Disposition: (S) inpatient rehabilitation facility (vs home with 24/7 assist and HHPT)  Planned Therapy Interventions: balance training, bed mobility training, gait training, home exercise program, patient/family education, stair training, strengthening, transfer training  PT Frequency: daily  Plan of Care Review  Plan Of Care Reviewed With: patient  Progress: improving  Outcome Summary/Follow up Plan: Pt increased gait distance to 134 feet with CGA and straight cane and progressed to stair training. Pt is discharging home today with family, made good progress toward goals during inpatient stay          Outcome Measures       11/09/17 0820 11/08/17 1121 11/08/17 1120    How much help from another person do you currently need...    Turning from your back to your side while in flat bed without using bedrails? 3  -MJ  2  -MJ    Moving from lying on back to sitting on the side of a flat bed without bedrails? 3  -MJ  2  -MJ    Moving to and from a bed to a chair (including a wheelchair)? 3  -MJ  3  -MJ    Standing up from a chair using your arms  face-to-face time with the   patient, greater than 50% of which was spent in counseling and the   coordination of care for this patient  Portions of the record may have been created with voice recognition   software  Occasional wrong word or "sound a like" substitutions may have   occurred due to the inherent limitations of voice recognition software  Read the chart carefully and recognize, using context, where substitutions   have occurred  DANISH Marino M D     Electronically signed 09/09/17 11:28 (e.g., wheelchair, bedside chair)? 3  -MJ  3  -MJ    Climbing 3-5 steps with a railing? 3  -MJ  2  -MJ    To walk in hospital room? 3  -MJ  3  -MJ    AM-PAC 6 Clicks Score 18  -MJ  15  -MJ    How much help from another is currently needed...    Putting on and taking off regular lower body clothing?  1  -AR     Bathing (including washing, rinsing, and drying)  2  -AR     Toileting (which includes using toilet bed pan or urinal)  3  -AR     Putting on and taking off regular upper body clothing  1  -AR     Taking care of personal grooming (such as brushing teeth)  3  -AR     Eating meals  3  -AR     Score  13  -AR     Functional Assessment    Outcome Measure Options AM-PAC 6 Clicks Basic Mobility (PT)  -MJ AM-PAC 6 Clicks Daily Activity (OT)  -AR AM-PAC 6 Clicks Basic Mobility (PT)  -MJ      User Key  (r) = Recorded By, (t) = Taken By, (c) = Cosigned By    Initials Name Provider Type    AR Cathy Pérez, OT Occupational Therapist    HANH Hidalgo PT Physical Therapist           Time Calculation:         PT Charges       11/09/17 0843          Time Calculation    Start Time 0820  -MJ      PT Received On 11/09/17  -MJ      PT Goal Re-Cert Due Date 11/18/17  -MJ      Time Calculation- PT    Total Timed Code Minutes- PT 12 minute(s)  -MJ        User Key  (r) = Recorded By, (t) = Taken By, (c) = Cosigned By    Initials Name Provider Type    HANH Hidalgo PT Physical Therapist          Therapy Charges for Today     Code Description Service Date Service Provider Modifiers Qty    98936108317 HC PT MOBILITY CURRENT 11/8/2017 Florin Hidalgo PT GP, CK 1    60405673001 HC PT MOBILITY PROJECTED 11/8/2017 Florin Hidalgo PT GP, CJ 1    69752028344 HC PT EVAL MOD COMPLEXITY 4 11/8/2017 Florin Hidalgo PT GP 1    25915988767 HC PT MOBILITY CURRENT 11/9/2017 Florin Hidalgo PT GP, CK 1    94958943259 HC PT MOBILITY PROJECTED 11/9/2017 Florin Hidalgo PT GP, CJ 1    11379624824 HC PT MOBILITY DISCHARGE 11/9/2017 Florin Hidalgo PT GP, CK 1     [id number]  GAIT TRAINING EA 15 MIN 11/9/2017 Florin Hidalgo, PT GP 1          PT G-Codes  PT Professional Judgement Used?: Yes  Outcome Measure Options: AM-PAC 6 Clicks Basic Mobility (PT)  Score: 18  Functional Limitation: Mobility: Walking and moving around  Mobility: Walking and Moving Around Current Status (): At least 40 percent but less than 60 percent impaired, limited or restricted  Mobility: Walking and Moving Around Goal Status (): At least 20 percent but less than 40 percent impaired, limited or restricted  Mobility: Walking and Moving Around Discharge Status (): At least 40 percent but less than 60 percent impaired, limited or restricted    PT Discharge Summary  Anticipated Discharge Disposition: (S) inpatient rehabilitation facility (vs home with 24/7 assist and HHPT)    Florin Hidalgo, PT  11/9/2017

## 2018-01-16 NOTE — PROGRESS NOTES
Chief Complaint  Patient and partner, Maxie Hodgkins, seen for genetic counseling to discuss concerns related to Fragile X carrier screening test results performed through her obstetrician  At the time of our genetic counseling session Pablito Barraza was approximately 14 weeks 6 days pregnant with her third pregnancy  Her prior pregnancy history is significant for a full-term vaginal delivery of a healthy daughter now 7years of age with her current partner and an elective termination of pregnancy for personal reasons with a previous partner  To review Pablito Barraza underwent routine carrier screening for CF, SMA, DMD and Fragile X through her obstetrician  She was screen negative for CF, SMA and DMD but 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  Specifically, one of her X chromosomes was found to have 60 CGG repeats  Studies adjusting for ascertainment of a premutation by way of population carrier screening versus a family history indicate an estimated 5 3% risk for expansion of premutations of this size  (Aline et al, 2003)  Assessment of the presence of AGG interruptions can further modify this risk  Those results were pending at the time of the genetic counseling session  Pablito Barraza and Johnsie Boas were advised of the availability of amniocentesis for prenatal diagnosis of fragile X  Amniocentesis is generally performed at 16 weeks or later in pregnancy and has less than a 1/300 risk for procedure complications to result in pregnancy loss  Chromosome analysis as well as DNA testing for fragile X is greater than 99% accurate  In addition microarray testing can detect submicroscopic deletions and duplications of genetic material throughout the genome  Amniotic fluid measurement of AFP is able to detect greater than 95% of open neural tube defects  Occasionally a repeat procedure is necessary due to cell culture failure   After discussing the risks, benefits and limitations of amniocentesis, Thayer Stammer and Johnsie Boas confirmed their decision to proceed with this testing for prenatal diagnosis of Fragile X  During our counseling session histories were taken on the patient's family and her 's family  Urban's family history is negative for any prior known cases of intellectual disability, birth defects or single gene disorders  In reviewing Adrianne's family history she reports that 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 with her  She provided records documenting this information including a copy of the karyotype  However, they karyotype which was from [place] [address] does not distinguish the breakpoints of the chromosomes and the copy is not clear enough to be able to further clarify the sizes of the pieces involved in the translocation  I explained to the patient and her  that without knowledge of the breakpoints it is difficult to clarify risk for an unbalanced form of the translocation to result in a live-born child with birth defects or intellectual disability  We discussed that Adrianne's reciprocal balanced chromosome translocations can result in 4 different possible chromosome constitutions in pregnancy:    1  Completely normal chromosomes  2  A balanced rearrangement like Adrianne  3  Two normal number 2 chromosomes with one normal number 12 chromosomes and a derivative number 12 chromosome  4  Two normal number 12 chromosomes with one normal number 2 chromosome and a derivative number 2 chromosome  The latter two pregnancy outcomes could result in miscarriage or a live-born child with birth defects or intellectual disability due to the imbalance of genetic information  Given that the breakpoints were not reported it is difficult to clarify this risk   In addition, the records that Joseline Payton provided indicate the translocation is de onofre occurring for the first time in her therefore looking at history in the extended family is also not helpful in clarifying potential risks  The patient and her partner indicate that this information confirmed her decision to proceed with amniocentesis  We discussed that chromosome analysis will be performed in order to determine whether or not the pregnancy inherited a balanced form of the translocation  MicroArray testing will also be performed to detect submicroscopic deletions and duplications of genetic material  They were advised that in some cases variance of uncertain clinical significance can be identified by MicroArray  In these instances clarification of risks can be difficult  In further reviewing her family history [facility] reports that she was born with an atrial septal defect requiring closure and an ablation  She also has a sacral dimple but has not caused her any neurological issues  We discussed the possibility that these birth defects are related to her chromosome translocation  We also discussed the possibility that they are occurring as a result of multifactorial inheritance  We reviewed the estimated 3% risk for recurrence of an isolated congenital heart defect or open neural tube defect given this history  Majority of isolated congenital heart defects and open neural tube defects are detected by ultrasound evaluation prenatally  [facility] also reports having a maternal aunt who  as an infant from liver issues  She was unable to be specific regarding the liver issues affecting this child therefore is not possible to clearly define the risk for similar problems or current pregnancy  Lastly, we discussed the fact that it is important to keep in mind that everyone in the general population regardless of age, family history, or medical background has approximately a 3% risk of having a child with some type of her defect, genetic disease or intellectual disability  Currently there are no tests available to rule out all birth defects or health problems      The patient reports being of [facility] and [place] descent while her partner reports being of [place], [place] and [place] descent  They both deny having any known Mormon ancestry  Hemoglobinopathy screening is recommended if not previously performed  Family History    1  No pertinent family history    2  No pertinent family history    3  No pertinent family history    4  No pertinent family history    Social History    · Former smoker (M69 16) (U73 468)    Current Meds   1  Diclegis 10-10 MG Oral Tablet Delayed Release; Take 1 tablet daily; Therapy: (Recorded:[id number]) to Recorded    Allergies    1  No Known Drug Allergies    2  No Known Environmental Allergies   3   No Known Food Allergies    Vitals  Signs   Recorded: [id number] 23:13MN   Systolic: 556, LUE, Sitting  Diastolic: 62, LUE, Sitting  Height: 5 ft 8 in  Weight: 144 lb   BMI Calculated: 21 9  BSA Calculated: 1 78  Pain Scale: 0    Future Appointments    Date/Time Provider Specialty Site   2017 08:00 AM  Yusef Mantilla   Electronically signed by : Varinder Colon, ; Sep  6 2017  4:51PM EST                       (Author)

## 2018-01-18 NOTE — MISCELLANEOUS
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

## 2018-01-22 VITALS
SYSTOLIC BLOOD PRESSURE: 109 MMHG | BODY MASS INDEX: 23.5 KG/M2 | DIASTOLIC BLOOD PRESSURE: 56 MMHG | WEIGHT: 155.05 LBS | HEIGHT: 68 IN

## 2018-01-22 VITALS
SYSTOLIC BLOOD PRESSURE: 120 MMHG | BODY MASS INDEX: 22.89 KG/M2 | DIASTOLIC BLOOD PRESSURE: 60 MMHG | HEIGHT: 68 IN | WEIGHT: 151.03 LBS

## 2018-01-22 VITALS
HEIGHT: 68 IN | SYSTOLIC BLOOD PRESSURE: 115 MMHG | DIASTOLIC BLOOD PRESSURE: 62 MMHG | BODY MASS INDEX: 21.82 KG/M2 | WEIGHT: 144 LBS

## 2018-01-24 PROCEDURE — 87653 STREP B DNA AMP PROBE: CPT | Performed by: OBSTETRICS & GYNECOLOGY

## 2018-01-25 ENCOUNTER — LAB REQUISITION (OUTPATIENT)
Dept: LAB | Facility: HOSPITAL | Age: 33
End: 2018-01-25
Payer: COMMERCIAL

## 2018-01-25 DIAGNOSIS — Z34.93 ENCOUNTER FOR SUPERVISION OF NORMAL PREGNANCY IN THIRD TRIMESTER: ICD-10-CM

## 2018-01-27 LAB — GP B STREP DNA SPEC QL NAA+PROBE: ABNORMAL

## 2018-02-08 ENCOUNTER — TRANSCRIBE ORDERS (OUTPATIENT)
Dept: ADMINISTRATIVE | Facility: HOSPITAL | Age: 33
End: 2018-02-08

## 2018-02-08 DIAGNOSIS — N64.4 MASTODYNIA: Primary | ICD-10-CM

## 2018-02-12 ENCOUNTER — HOSPITAL ENCOUNTER (OUTPATIENT)
Dept: ULTRASOUND IMAGING | Facility: CLINIC | Age: 33
Discharge: HOME | End: 2018-02-12
Payer: COMMERCIAL

## 2018-02-12 DIAGNOSIS — N64.4 MASTODYNIA: ICD-10-CM

## 2018-02-12 PROCEDURE — 76642 ULTRASOUND BREAST LIMITED: CPT

## 2018-02-23 ENCOUNTER — HOSPITAL ENCOUNTER (INPATIENT)
Facility: HOSPITAL | Age: 33
LOS: 2 days | Discharge: HOME | End: 2018-02-25
Attending: OBSTETRICS & GYNECOLOGY | Admitting: OBSTETRICS & GYNECOLOGY
Payer: COMMERCIAL

## 2018-02-23 LAB
ABO GROUP BLD: NORMAL
BASE EXCESS BLDCOA CALC-SCNC: -3.9 MMOL/L (ref 3–11)
BASE EXCESS BLDCOV CALC-SCNC: -4.6 MMOL/L (ref 1–9)
BLD GP AB SCN SERPL QL: NEGATIVE
ERYTHROCYTE [DISTWIDTH] IN BLOOD BY AUTOMATED COUNT: 13.7 % (ref 11.6–15.1)
HCO3 BLDCOA-SCNC: 25.3 MMOL/L (ref 17.3–27.3)
HCO3 BLDCOV-SCNC: 20.6 MMOL/L (ref 12.2–28.6)
HCT VFR BLD AUTO: 34.1 % (ref 34.8–46.1)
HGB BLD-MCNC: 11.4 G/DL (ref 11.5–15.4)
MCH RBC QN AUTO: 30.6 PG (ref 26.8–34.3)
MCHC RBC AUTO-ENTMCNC: 33.4 G/DL (ref 31.4–37.4)
MCV RBC AUTO: 91 FL (ref 82–98)
O2 CT VFR BLDCOA CALC: 8.6 ML/DL
OXYHGB MFR BLDCOA: 38.3 %
OXYHGB MFR BLDCOV: 68.8 %
PCO2 BLDCOA: 63.2 MM[HG] (ref 30–60)
PCO2 BLDCOV: 38.5 MM HG (ref 27–43)
PH BLDCOA: 7.22 [PH] (ref 7.23–7.43)
PH BLDCOV: 7.35 [PH] (ref 7.19–7.49)
PLATELET # BLD AUTO: 232 THOUSANDS/UL (ref 149–390)
PMV BLD AUTO: 11.9 FL (ref 8.9–12.7)
PO2 BLDCOA: 20 MM HG (ref 5–25)
PO2 BLDCOV: 29 MM HG (ref 15–45)
RBC # BLD AUTO: 3.73 MILLION/UL (ref 3.81–5.12)
RH BLD: NEGATIVE
RPR SER QL: NORMAL
SAO2 % BLDCOV: 15.3 ML/DL
SPECIMEN EXPIRATION DATE: NORMAL
WBC # BLD AUTO: 12.16 THOUSAND/UL (ref 4.31–10.16)

## 2018-02-23 PROCEDURE — 86901 BLOOD TYPING SEROLOGIC RH(D): CPT | Performed by: OBSTETRICS & GYNECOLOGY

## 2018-02-23 PROCEDURE — 0HQ9XZZ REPAIR PERINEUM SKIN, EXTERNAL APPROACH: ICD-10-PCS | Performed by: OBSTETRICS & GYNECOLOGY

## 2018-02-23 PROCEDURE — 86850 RBC ANTIBODY SCREEN: CPT | Performed by: OBSTETRICS & GYNECOLOGY

## 2018-02-23 PROCEDURE — 86592 SYPHILIS TEST NON-TREP QUAL: CPT | Performed by: OBSTETRICS & GYNECOLOGY

## 2018-02-23 PROCEDURE — 86900 BLOOD TYPING SEROLOGIC ABO: CPT | Performed by: OBSTETRICS & GYNECOLOGY

## 2018-02-23 PROCEDURE — 85027 COMPLETE CBC AUTOMATED: CPT | Performed by: OBSTETRICS & GYNECOLOGY

## 2018-02-23 PROCEDURE — 99212 OFFICE O/P EST SF 10 MIN: CPT

## 2018-02-23 PROCEDURE — 82805 BLOOD GASES W/O2 SATURATION: CPT | Performed by: OBSTETRICS & GYNECOLOGY

## 2018-02-23 RX ORDER — OXYTOCIN/RINGER'S LACTATE 30/500 ML
250 PLASTIC BAG, INJECTION (ML) INTRAVENOUS CONTINUOUS
Status: ACTIVE | OUTPATIENT
Start: 2018-02-23 | End: 2018-02-23

## 2018-02-23 RX ORDER — SODIUM CHLORIDE, SODIUM LACTATE, POTASSIUM CHLORIDE, CALCIUM CHLORIDE 600; 310; 30; 20 MG/100ML; MG/100ML; MG/100ML; MG/100ML
125 INJECTION, SOLUTION INTRAVENOUS CONTINUOUS
Status: DISCONTINUED | OUTPATIENT
Start: 2018-02-23 | End: 2018-02-23

## 2018-02-23 RX ORDER — METOCLOPRAMIDE HYDROCHLORIDE 5 MG/ML
10 INJECTION INTRAMUSCULAR; INTRAVENOUS EVERY 6 HOURS PRN
Status: DISCONTINUED | OUTPATIENT
Start: 2018-02-23 | End: 2018-02-25 | Stop reason: HOSPADM

## 2018-02-23 RX ORDER — ONDANSETRON 2 MG/ML
4 INJECTION INTRAMUSCULAR; INTRAVENOUS EVERY 6 HOURS PRN
Status: DISCONTINUED | OUTPATIENT
Start: 2018-02-23 | End: 2018-02-23

## 2018-02-23 RX ORDER — OXYCODONE HYDROCHLORIDE AND ACETAMINOPHEN 5; 325 MG/1; MG/1
1 TABLET ORAL EVERY 4 HOURS PRN
Status: DISCONTINUED | OUTPATIENT
Start: 2018-02-23 | End: 2018-02-25 | Stop reason: HOSPADM

## 2018-02-23 RX ORDER — BUPIVACAINE HYDROCHLORIDE 2.5 MG/ML
INJECTION, SOLUTION EPIDURAL; INFILTRATION; INTRACAUDAL
Status: COMPLETED
Start: 2018-02-23 | End: 2018-02-23

## 2018-02-23 RX ORDER — DIAPER,BRIEF,INFANT-TODD,DISP
1 EACH MISCELLANEOUS AS NEEDED
Status: DISCONTINUED | OUTPATIENT
Start: 2018-02-23 | End: 2018-02-25 | Stop reason: HOSPADM

## 2018-02-23 RX ORDER — DIPHENHYDRAMINE HCL 25 MG
25 TABLET ORAL EVERY 6 HOURS PRN
Status: DISCONTINUED | OUTPATIENT
Start: 2018-02-23 | End: 2018-02-25 | Stop reason: HOSPADM

## 2018-02-23 RX ORDER — OXYCODONE HYDROCHLORIDE AND ACETAMINOPHEN 5; 325 MG/1; MG/1
2 TABLET ORAL EVERY 4 HOURS PRN
Status: DISCONTINUED | OUTPATIENT
Start: 2018-02-23 | End: 2018-02-25 | Stop reason: HOSPADM

## 2018-02-23 RX ORDER — IBUPROFEN 600 MG/1
600 TABLET ORAL EVERY 6 HOURS PRN
Status: DISCONTINUED | OUTPATIENT
Start: 2018-02-23 | End: 2018-02-25 | Stop reason: HOSPADM

## 2018-02-23 RX ORDER — DOCUSATE SODIUM 100 MG/1
100 CAPSULE, LIQUID FILLED ORAL 2 TIMES DAILY PRN
Status: DISCONTINUED | OUTPATIENT
Start: 2018-02-23 | End: 2018-02-25 | Stop reason: HOSPADM

## 2018-02-23 RX ORDER — MAGNESIUM HYDROXIDE/ALUMINUM HYDROXICE/SIMETHICONE 120; 1200; 1200 MG/30ML; MG/30ML; MG/30ML
15 SUSPENSION ORAL EVERY 6 HOURS PRN
Status: DISCONTINUED | OUTPATIENT
Start: 2018-02-23 | End: 2018-02-25 | Stop reason: HOSPADM

## 2018-02-23 RX ORDER — CALCIUM CARBONATE 200(500)MG
1000 TABLET,CHEWABLE ORAL DAILY PRN
Status: DISCONTINUED | OUTPATIENT
Start: 2018-02-23 | End: 2018-02-25 | Stop reason: HOSPADM

## 2018-02-23 RX ORDER — ONDANSETRON 2 MG/ML
4 INJECTION INTRAMUSCULAR; INTRAVENOUS EVERY 8 HOURS PRN
Status: DISCONTINUED | OUTPATIENT
Start: 2018-02-23 | End: 2018-02-25 | Stop reason: HOSPADM

## 2018-02-23 RX ORDER — OXYTOCIN/RINGER'S LACTATE 30/500 ML
PLASTIC BAG, INJECTION (ML) INTRAVENOUS
Status: COMPLETED
Start: 2018-02-23 | End: 2018-02-23

## 2018-02-23 RX ORDER — ACETAMINOPHEN 325 MG/1
650 TABLET ORAL EVERY 4 HOURS PRN
Status: DISCONTINUED | OUTPATIENT
Start: 2018-02-23 | End: 2018-02-25 | Stop reason: HOSPADM

## 2018-02-23 RX ORDER — SIMETHICONE 80 MG
80 TABLET,CHEWABLE ORAL 4 TIMES DAILY PRN
Status: DISCONTINUED | OUTPATIENT
Start: 2018-02-23 | End: 2018-02-25 | Stop reason: HOSPADM

## 2018-02-23 RX ADMIN — IBUPROFEN 600 MG: 600 TABLET, FILM COATED ORAL at 06:11

## 2018-02-23 RX ADMIN — IBUPROFEN 600 MG: 600 TABLET, FILM COATED ORAL at 20:39

## 2018-02-23 RX ADMIN — BENZOCAINE AND MENTHOL 1 APPLICATION: 20; .5 SPRAY TOPICAL at 06:11

## 2018-02-23 RX ADMIN — Medication 250 UNITS: at 05:15

## 2018-02-23 RX ADMIN — BUPIVACAINE HYDROCHLORIDE 30 ML: 2.5 INJECTION, SOLUTION EPIDURAL; INFILTRATION; INTRACAUDAL; PERINEURAL at 05:15

## 2018-02-23 RX ADMIN — HYDROCORTISONE 1 APPLICATION: 1 CREAM TOPICAL at 06:10

## 2018-02-23 RX ADMIN — WITCH HAZEL 1 PAD: 500 SOLUTION RECTAL; TOPICAL at 06:10

## 2018-02-23 RX ADMIN — IBUPROFEN 600 MG: 600 TABLET, FILM COATED ORAL at 14:17

## 2018-02-23 NOTE — LACTATION NOTE
This note was copied from a baby's chart  CONSULT - LACTATION  Baby Girl  Kelly Blankenship) [address] [employer] [facility] 0 days female MRN: [id number]    [address] [facility] NURSERY Room / Bed: L&D 308(N)/L&D 308(N) Encounter: [id number]    Maternal Information     MOTHER:  Adrianne Hawkins  Maternal Age: 33 y o    OB History: #: 1, Date: None, Sex: None, Weight: None, GA: None, Delivery: None, Apgar1: None, Apgar5: None, Living: None, Birth Comments: None    #: 2, Date: None, Sex: None, Weight: None, GA: None, Delivery: None, Apgar1: None, Apgar5: None, Living: None, Birth Comments: None    #: 3, Date: None, Sex: None, Weight: None, GA: None, Delivery: None, Apgar1: None, Apgar5: None, Living: None, Birth Comments: None   Previouse breast reduction surgery?  No    Lactation history:   Has patient previously breast fed: Yes   How long had patient previously breast fed: 1 month   Previous breast feeding complications:       Past Surgical History:   Procedure Laterality Date    ASD REPAIR      BREAST SURGERY Bilateral     implants and removal    CARDIAC ELECTROPHYSIOLOGY STUDY AND ABLATION      MN COLPOSCOPY,CERVIX W/ADJ VAG,W/LOOP BX N/A 2017    Procedure: BIOPSY LEEP CERVIX;  Surgeon: Kang Baltazar DO;  Location: [facility];  Service: Gynecology       Birth information:  YOB: 2017   Time of birth: 9:18 AM   Sex: female   Delivery type: Vaginal, Spontaneous Delivery   Birth Weight: 3799 g (8 lb 6 oz)   Percent of Weight Change: 0%     Gestational Age: <None>   [unfilled]    Assessment     Breast and nipple assessment: did not assess at this time    [place] Assessment: did not assess at this time    Feeding assessment: feeding well  LATCH:  Latch: Grasps breast, tongue down, lips flanged, rhythmic sucking   Audible Swallowing: Spontaneous and intermittent (24 hours old)   Type of Nipple: Everted (After stimulation)   Comfort (Breast/Nipple): Soft/non-tender   Hold (Positioning): Full assist, teach one side, mother does other, staff holds   [facility] Score: 9          Feeding recommendations:  breast feed on demand     Breastfeeding booklets given and reviewed with mother  Father of baby present and received education  Mother verbalized breastfeeding is going well, but she is getting sore already  Enc to call for assistance next feeding and as needed,phone # given      Osmany Cantu RN 2/23/2018 12:12 PM

## 2018-02-23 NOTE — L&D DELIVERY NOTE
Vaginal Delivery Summary - OB/GYN   Hoa Talamantes 33 y o  female MRN: [id number]  Unit/Bed#: L&D 322-01 Encounter: [id number]      Predelivery Diagnosis:  1  Pregnancy at 40 weeks   2  GBS positive    Postdelivery Diagnosis:  1  Same as above  2  Precipitous delivery of term     Procedure: Spontaneous Vaginal Delivery and repair of first-degree perineal laceration    Attending:  Julián Frank    Assistant: Pan    Anesthesia: Epidural    EBL: 669 cc    Complications: none apparent    Specimens: cord blood, arterial and venous cord blood gasses, placenta to storage    Findings:   1  Viable female at 0508, with APGARS of 9 and 9 at 1 and 5 minutes respectively,  2  Spontaneous delivery of intact placenta at 0513  3  1 degree laceration repaired with 0 Vicryl rapide  4  Arterial blood gas pH is 7 20 and base excess of -3 9  5  Venous blood gas pH of 7 346 with base excess of -4 6    Disposition:  Patient tolerated the procedure well and was recovering in labor and delivery room     Brief history and labor course:  Ms Hoa Talamantes is a 33 y o   at 40 wks  She presented to labor and delivery complaining of contractions  Her pregnancy was complicated by GBS positive  On exam in triage she was noted to be complete and +2  She was admitted for expectant management  Patient spontaneously ruptured at [facility] and began pushing at 3096    Description of procedure    After pushing for 4 minutes, at 0508 patient delivered a viable female , wt 8lb6oz, apgars of 9 (1 min) and 9 (5 min)  The fetal vertex delivered spontaneously  Baby was JACOB  Baby restituted to the left  The anterior shoulder delivered atraumatically with maternal expulsive forces and the assistance of downward guidance  The posterior shoulder delivered with maternal expulsive forces and the assistance of upward guidance  The remainder of the fetus delivered spontaneously    There is noted to be a nuchal cord x1 and also a cord around the shoulder as well as the right lower extremity and around the trunk  These were reduced at the perineum  Upon delivery, the infant was placed on the mothers abdomen and the cord was clamped and cut  Delayed cord clamping was performed  The infant was noted to cry spontaneously and was moving all extremities appropriately  There was no evidence for injury  Awaiting nurse resuscitators evaluated the   Arterial and venous cord blood gases and cord blood was collected for analysis  These were promptly sent to the lab  In the immediate post-partum, 30 units of IV pitocin was administered, and the uterus was noted to contract down well with massage and pitocin  The placenta delivered spontaneously at 0513 and was noted to have a centrally inserted 3 vessel cord  The vagina, cervix, perineum, and rectum were inspected and there was noted to be a first-degree perineal laceration  Patient received 10 cc of 1% lidocaine for local anesthesia  Her laceration was repaired with 3-0 Vicryl repeat in 2 layers  Good hemostasis was noted  At the conclusion of the procedure, all needle, sponge, and instrument counts were noted to be correct  Patient tolerated the procedure well and was allowed to recover in labor and delivery room with family and  before being transferred to the post-partum floor  The attending was present and participated in all key portions of the case      Tanika Sloan  2018  8:19 AM

## 2018-02-23 NOTE — LACTATION NOTE
This note was copied from a baby's chart  Assisted mom with breastfeeding  Demo hand expression  Demo  proper position and latching in cross cradle hold  Baby latched well   Enc mom to call for further assistance as needed

## 2018-02-23 NOTE — DISCHARGE INSTRUCTIONS
Vaginal Delivery   WHAT YOU SHOULD KNOW:   A vaginal delivery is the birth of your baby through your vagina (birth canal)  AFTER YOU LEAVE:   Medicines:  · NSAIDs  help decrease swelling and pain or fever  This medicine is available with or without a doctor's order  NSAIDs can cause stomach bleeding or kidney problems in certain people  If you take blood thinner medicine, always ask your healthcare provider if NSAIDs are safe for you  Always read the medicine label and follow directions  · Take your medicine as directed  Call your healthcare provider if you think your medicine is not helping or if you have side effects  Tell him if you are allergic to any medicine  Keep a list of the medicines, vitamins, and herbs you take  Include the amounts, and when and why you take them  Bring the list or the pill bottles to follow-up visits  Carry your medicine list with you in case of an emergency  Follow up with your primary healthcare provider:  Most women need to return 6 weeks after a vaginal delivery  Ask about how to care for your wounds or stitches  Write down your questions so you remember to ask them during your visits  Activity:  Rest as much as possible  Try to keep all activities short  You may be able to do some exercise soon after you have your baby  Talk with your primary healthcare provider before you start exercising  If you work outside the home, ask when you can return to your job  Kegel exercises:  Kegel exercises may help your vaginal and rectal muscles heal faster  You can do Kegel exercises by tightening and relaxing the muscles around your vagina  Kegel exercises help make the muscles stronger  Breast care:  When your milk comes in, your breasts may feel full and hard  Ask how to care for your breasts, even if you are not breastfeeding  Constipation:  Do not try to push the bowel movement out if it is too hard   High-fiber foods, extra liquids, and regular exercise can help you prevent constipation  Examples of high-fiber foods are fruit and bran  Prune juice and water are good liquids to drink  Regular exercise helps your digestive system work  You may also be told to take over-the-counter fiber and stool softener medicines  Take these items as directed  Hemorrhoids:  Pregnancy can cause severe hemorrhoids  You may have rectal pain because of the hemorrhoids  Ask how to prevent or treat hemorrhoids  Perineum care: Your perineum is the area between your vagina and anus  Keep the area clean and dry to help it heal and to prevent infection  Wash the area gently with soap and water when you bathe or shower  Rinse your perineum with warm water when you use the toilet  Your primary healthcare provider may suggest you use a warm sitz bath to help decrease pain  A sitz bath is a bathtub or basin filled to hip level  Stay in the sitz bath for 20 to 30 minutes, or as directed  Vaginal discharge: You will have vaginal discharge, called lochia, after your delivery  The lochia is bright red the first day or two after the birth  By the fourth day, the amount decreases, and it turns red-brown  Use a sanitary pad rather than a tampon to prevent a vaginal infection  It is normal to have lochia up to 8 weeks after your baby is born  Monthly periods: Your period may start again within 7 to 12 weeks after your baby is born  If you are breastfeeding, it may take longer for your period to start again  You can still get pregnant again even though you do not have your monthly period  Talk with your primary healthcare provider about a birth control method that will be good for you if you do not want to get pregnant  Mood changes: Many new mothers have some kind of mood changes after delivery  Some of these changes occur because of lack of sleep, hormone changes, and caring for a new baby  Some mood changes can be more serious, such as postpartum depression   Talk with your primary healthcare provider if you feel unable to care for yourself or your baby  Sexual activity:  You may need to avoid sex for 6 to 7 weeks after you have your baby  You may notice you have a decreased desire for sex, or sex may be painful  You may need to use a vaginal lubricant (gel) to help make sex more comfortable  Contact your primary healthcare provider if:   · You have heavy vaginal bleeding that fills 1 or more sanitary pads in 1 hour  · You have a fever  · Your pain does not go away, or gets worse  · The skin between your vagina and rectum is swollen, warm, or red  · You have swollen, hard, or painful breasts  · You feel very sad or depressed  · You feel more tired than usual      · You have questions or concerns about your condition or care  Seek care immediately or call 911 if:   · You have pus or yellow drainage coming from your vagina or wound  · You are urinating very little, or not at all  · Your arm or leg feels warm, tender, and painful  It may look swollen and red  · You feel lightheaded, have sudden and worsening chest pain, or trouble breathing  You may have more pain when you take deep breaths or cough, or you may cough up blood  © 2014 [address] is for End User's use only and may not be sold, redistributed or otherwise used for commercial purposes  All illustrations and images included in CareNotes® are the copyrighted property of [employer]  or [employer]  The above information is an  only  It is not intended as medical advice for individual conditions or treatments  Talk to your doctor, nurse or pharmacist before following any medical regimen to see if it is safe and effective for you

## 2018-02-23 NOTE — CASE MANAGEMENT
Notification of Maternity Inpatient Admission/Maternity Inpatient Authorization Request  This is a Notification of Maternity Inpatient Admission/Maternity Inpatient Authorization Request to our facility [address] [facility]  Please be advised that this patient is currently in our facility under Inpatient Status  Below you will find the Birth/[place] Summary, Attending Physician and Facilitys information including NPI# and contact for the Utilization  assigned to the [facility] where the patient is receiving services  Please feel free to contact the Utilization Review Department with any questions  Mothers Information:  Riley Gan  MRN: [id number]  YOB: 1985  Admission Date: 2018  4:45 AM  Discharge Date: No discharge date for patient encounter  Disposition: Home/Self Care  Admitting Diagnosis: Encounter for full-term uncomplicated delivery [N45]   Information:  Estimated Date of Delivery: None noted  Information for the patient's :  Roxann Schumacher Girl  Chapito Connolly) [[id number]]     [place] Delivery Information:  Sex: female  Delivered 2018 5:08 AM by Vaginal, Spontaneous Delivery; Gestational Age: <None>     Measurements:  Weight: 8 lb 6 oz (3799 g); Height: 20 5"    APGAR 1 minute 5 minutes 10 minutes   Totals: 9 9      OB History      Para Term  AB Living    3 1 1 0 1 1    SAB TAB Ectopic Multiple Live Births    0 0 0 0 2        Attending Physician:  John Weiss    Specialty- Obstetrics and Gynecology,   [facility] ID- [id number]  [facility]  [address] [facility]  [place], [address]  Phone 1: [phone]  Fax: [phone]    Facility: [address], [facility], [address]  [phone]  Tax ID: [id number]  NPI: [id number]    [address] [facility] in the [facility] by [employer] for 2017  Network Utilization Review Department  Phone: [phone]; Fax [phone]  ATTENTION: The Network Utilization Review Department is now centralized for our 7 Facilities  Make a note that we have a new phone and fax numbers for our Department  Please call with any questions or concerns to [phone] and carefully follow the prompts so that you are directed to the right person  All voicemails are confidential  Fax any determinations, approvals, denials, and requests for initial or continue stay review clinical to [phone]  **Due to HIGH CALL volume, it would be easier if you could please send daily logs  This will expedite your requests and in part, help us provide discharge notifications faster   **

## 2018-02-23 NOTE — DISCHARGE SUMMARY
Discharge Summary - Franklin Hunt 33 y o  female MRN: [id number]    Unit/Bed#: L&D 322-01 Encounter: [id number]    Admission Date: 2018     Discharge Date: 18    Delivery Attending: Iraj Eduardo DO    Discharge attending: Dr Yony Degroot Diagnosis: Encounter for full-term uncomplicated delivery [M07]    Secondary Diagnosis:  IUPAt 40 weeks in spontaneous labor     Procedures: Spontaneous Vaginal Delivery and repair of first-degree perineal laceration    Complications: none apparent    Hospital Course:  Patient came to the hospital complaining of contractions  She was noted to be complete and +2  She spontaneously ruptured shortly after arrival   She delivered a viable female  at 5:08 a m  with Apgars of 9 and 9 at 1 5 minutes respectively  Her placenta delivered spontaneously  She had a first-degree laceration which was repaired with local anesthesia and suture  Patient was transferred to postpartum in stable condition  Baby was transferred to  nursery  Her postoperative course was uncomplicated    Condition at discharge: good     On day of discharge pain was well controlled, patient was tolerating PO, passing flatus, without a bowel movement  She was discharged with standard post partum instructions to follow up with her physician in 3-6 weeks for a postpartum appointment  Discharge instructions/Information to patient and family:   See after visit summary for information provided to patient and family  Provisions for Follow-Up Care:  See after visit summary for information related to follow-up care and any pertinent home health orders  Disposition: See After Visit Summary for discharge disposition information  Planned Readmission: No    Discharge Medications: For a complete list of the patient's medications, please refer to her med rec      Sydnee Oden MD  OB/GYN PGY-2  2018 6:36 AM

## 2018-02-23 NOTE — H&P
H&P Exam - Obstetrics   Rebekah Mckee 33 y o  female MRN: [id number]  Unit/Bed#: L&D 322-01 Encounter: [id number]      History of Present Illness     Chief Complaint: Contractions    HPI:  Rebekah Mceke is a 33 y o   female with an ABDULLAHI of 2018  at 40 weeks gestation who is being admitted for active labor  Contractions:  Very strong every 2-3 minutes  Vaginal Bleeding:  None  Loss of Fluid:  None  Fetal Movement:  Present    PREGNANCY COMPLICATIONS:   Long interval pregnanc, family history of fragile X, history of maternal cardiac anomaly and tachycardia    OB History    Para Term  AB Living   2 1           SAB TAB Ectopic Multiple Live Births           1      # Outcome Date GA Lbr Alfred/2nd Weight Sex Delivery Anes PTL Lv   2 Current            1 Para                   Baby complications/comments:  None    Review of Systems   Gastrointestinal: Positive for abdominal pain  Genitourinary: Negative for vaginal bleeding and vaginal discharge         Historical Information   Past Medical History:   Diagnosis Date    Anxiety     Depression     Irregular heart beat      Past Surgical History:   Procedure Laterality Date    ASD REPAIR      BREAST SURGERY Bilateral     implants and removal    CARDIAC ELECTROPHYSIOLOGY STUDY AND ABLATION      VT COLPOSCOPY,CERVIX W/ADJ VAG,W/LOOP BX N/A 2017    Procedure: BIOPSY LEEP CERVIX;  Surgeon: Magdalena Ramon DO;  Location: [facility];  Service: Gynecology     Social History   History   Alcohol Use    Yes     Comment: occ     History   Drug Use No     History   Smoking Status    Former Smoker    Quit date: 2017   Smokeless Tobacco    Not on file     Family History: non-contributory    Meds/Allergies    {  Prescriptions Prior to Admission   Medication    ibuprofen (MOTRIN) 200 mg tablet    Omega-3 Fatty Acids (FISH OIL OMEGA-3 PO)    Probiotic Product (PROBIOTIC PO)      No Known Allergies    OBJECTIVE:    Vitals:   BP 137/76   Pulse 74   Temp 98 2 °F (36 8 °C) (Oral)   Resp 20   Ht 5' 8" (1 727 m)   Wt 63 5 kg (140 lb)   LMP 2017 (Exact Date) Comment: urine hcg negative  Breastfeeding? Yes   BMI 21 29 kg/m²   Body mass index is 21 29 kg/m²  Physical Exam   Constitutional: She is oriented to person, place, and time  She appears well-developed and well-nourished  She appears distressed  Pulmonary/Chest: Effort normal  No respiratory distress  Abdominal: Soft  There is no tenderness  There is no rebound and no guarding  Gravid   Neurological: She is alert and oriented to person, place, and time  SVE:   complete +2, bulging membranes    FHT:     TOCO:          Prenatal Labs:   Blood type: O neg  Antigen Screen: negative  Rubella: Immune  HIV: Negative  RPR: NR  Hep B: negative  Diabetes Screen: 103  GBS:   Positive      Invasive Devices     Peripheral Intravenous Line            Peripheral IV 18 Left Wrist less than 1 day                  Assessment/Plan     ASSESSMENT:   IUP at 40 weeks weeks gestation with active labor  PLAN:   1) Admit   2) CBC, RPR, Blood Type   3) Analgesia and/or epidural at patient request   4) Anticipate    5) will attempt to start penicillin for GBS prophylaxis   6) Dr Jessy Aldana on her way in        This patient will be an INPATIENT  and I certify the anticipated length of stay is >2 Midnights        Alee Velasquez MD  2018  5:39 AM

## 2018-02-24 RX ADMIN — IBUPROFEN 600 MG: 600 TABLET, FILM COATED ORAL at 19:30

## 2018-02-24 RX ADMIN — DOCUSATE SODIUM 100 MG: 100 CAPSULE, LIQUID FILLED ORAL at 19:30

## 2018-02-24 RX ADMIN — IBUPROFEN 600 MG: 600 TABLET, FILM COATED ORAL at 08:40

## 2018-02-24 RX ADMIN — DOCUSATE SODIUM 100 MG: 100 CAPSULE, LIQUID FILLED ORAL at 08:38

## 2018-02-24 NOTE — PROGRESS NOTES
Progress Note - Obstetrics  Post-Partum Physician Note   Jose Sees 33 y o  female MRN: [id number]  Unit/Bed#: L&D 308-01 Encounter: [id number]    SUBJECTIVE:    Pain: 0/10  Tolerating Oral Intake: is tolerating PO liquids and solids  Voiding: yes - spontaneously  Flatus: yes  Bowel Movement: no  Ambulating: yes  Breastfeeding: Breastfeeding  Chest Pain: no  Shortness of Breath: no  Leg Pain/Discomfort: no  Lochia: moderate  Other:       OBJECTIVE:     Vitals:   Vitals:    02/23/18 1438 02/23/18 1928 02/23/18 2300 02/24/18 0425   BP: 113/64 125/77 106/64 108/73   BP Location: Right arm Left arm Right arm Left arm   Pulse: 67 79 72 72   Resp: 18 16 18 16   Temp: 98 9 °F (37 2 °C) 98 2 °F (36 8 °C) 98 6 °F (37 °C) 98 3 °F (36 8 °C)   TempSrc: Oral Oral Oral Oral   SpO2:  98%     Weight:       Height:           I/O       02/22 0701 - 02/23 0700 02/23 0701 - 02/24 0700    P  O   480    Total Intake(mL/kg)  480 (7 6)    Urine (mL/kg/hr)  1000 (0 7)    Blood 300     Total Output 300 1000    Net -300 -520                  Results from last 7 days  Lab Units 02/23/18  0501   WBC Thousand/uL 12 16*   HEMOGLOBIN g/dL 11 4*   MCV fL 91   PLATELETS Thousands/uL 232       General: No Acute Distress  Cardiovascular: Regular Rate and Rhythm  Lungs: Clear to Auscultation Bilaterally, no wheezing, rhonchi or rales  Abdomen: Non-distended, no rebound or guarding  Fundus: Firm  Fundal Location: -1 cm below the umbilicus  Incision: not applicable  Lower Extremities: Non-Tender  Other:    MEDS:   Current Facility-Administered Medications   Medication Dose Route Frequency    acetaminophen (TYLENOL) tablet 650 mg  650 mg Oral Q4H PRN    aluminum-magnesium hydroxide-simethicone (MYLANTA) 200-200-20 mg/5 mL oral suspension 15 mL  15 mL Oral Q6H PRN    benzocaine-menthol-lanolin-aloe (DERMOPLAST) 20-0 5 % topical spray 1 application  1 application Topical 4x Daily PRN    calcium carbonate (TUMS) chewable tablet 1,000 mg  1,000 mg Oral Daily PRN    diphenhydrAMINE (BENADRYL) tablet 25 mg  25 mg Oral Q6H PRN    docusate sodium (COLACE) capsule 100 mg  100 mg Oral BID PRN    hydrocortisone 1 % cream 1 application  1 application Topical PRN    ibuprofen (MOTRIN) tablet 600 mg  600 mg Oral Q6H PRN    magnesium hydroxide (MILK OF MAGNESIA) 400 mg/5 mL oral suspension 30 mL  30 mL Oral Daily PRN    metoclopramide (REGLAN) injection 10 mg  10 mg Intravenous Q6H PRN    ondansetron (ZOFRAN) injection 4 mg  4 mg Intravenous Q8H PRN    oxyCODONE-acetaminophen (PERCOCET) 5-325 mg per tablet 1 tablet  1 tablet Oral Q4H PRN    oxyCODONE-acetaminophen (PERCOCET) 5-325 mg per tablet 2 tablet  2 tablet Oral Q4H PRN    Rho(D) immune globulin (RHOGAM ULTRA-FILTERED PLUS) IM injection 300 mcg  300 mcg Intramuscular Once    simethicone (MYLICON) chewable tablet 80 mg  80 mg Oral 4x Daily PRN    witch hazel-glycerin (TUCKS) topical pad 1 pad  1 pad Topical PRN     Invasive Devices          No matching active lines, drains, or airways          A/P:  Postpartum day # 1 status post Spontaneous Vaginal Delivery    1) Continue Routine Postpartum Care  2) Encourage Ambulation  3) Advance diet as tolerated  4) Anticipate d/c home tomorrow    Signature / Title: Keith Griffith MD, Resident - Ob/Gyn

## 2018-02-24 NOTE — PLAN OF CARE
Problem: PAIN - ADULT  Goal: Verbalizes/displays adequate comfort level or baseline comfort level  Interventions:  - Encourage patient to monitor pain and request assistance  - Assess pain using appropriate pain scale  - Administer analgesics based on type and severity of pain and evaluate response  - Implement non-pharmacological measures as appropriate and evaluate response  - Consider cultural and social influences on pain and pain management  - Notify physician/advanced practitioner if interventions unsuccessful or patient reports new pain   Outcome: Progressing      Problem: INFECTION - ADULT  Goal: Absence or prevention of progression during hospitalization  INTERVENTIONS:  - Assess and monitor for signs and symptoms of infection  - Monitor lab/diagnostic results  - Monitor all insertion sites, i e  indwelling lines, tubes, and drains  - Monitor endotracheal (as able) and nasal secretions for changes in amount and color  - [place] appropriate cooling/warming therapies per order  - Administer medications as ordered  - Instruct and encourage patient and family to use good hand hygiene technique  - Identify and instruct in appropriate isolation precautions for identified infection/condition   Outcome: Progressing    Goal: Absence of fever/infection during neutropenic period  INTERVENTIONS:  - Monitor WBC  - Implement neutropenic guidelines   Outcome: Progressing      Problem: SAFETY ADULT  Goal: Patient will remain free of falls  INTERVENTIONS:  - Assess patient frequently for physical needs  -  Identify cognitive and physical deficits and behaviors that affect risk of falls    -  [place] fall precautions as indicated by assessment   - Educate patient/family on patient safety including physical limitations  - Instruct patient to call for assistance with activity based on assessment  - Modify environment to reduce risk of injury  - Consider OT/PT consult to assist with strengthening/mobility   Outcome: Progressing    Goal: Maintain or return to baseline ADL function  INTERVENTIONS:  -  Assess patient's ability to carry out ADLs; assess patient's baseline for ADL function and identify physical deficits which impact ability to perform ADLs (bathing, care of mouth/teeth, toileting, grooming, dressing, etc )  - Assess/evaluate cause of self-care deficits   - Assess range of motion  - Assess patient's mobility; develop plan if impaired  - Assess patient's need for assistive devices and provide as appropriate  - Encourage maximum independence but intervene and supervise when necessary  ¯ Involve family in performance of ADLs  ¯ Assess for home care needs following discharge   ¯ Request OT consult to assist with ADL evaluation and planning for discharge  ¯ Provide patient education as appropriate   Outcome: Progressing    Goal: Maintain or return mobility status to optimal level  INTERVENTIONS:  - Assess patient's baseline mobility status (ambulation, transfers, stairs, etc )    - Identify cognitive and physical deficits and behaviors that affect mobility  - Identify mobility aids required to assist with transfers and/or ambulation (gait belt, sit-to-stand, lift, walker, cane, etc )  - [place] fall precautions as indicated by assessment  - Record patient progress and toleration of activity level on Mobility SBAR; progress patient to next Phase/Stage  - Instruct patient to call for assistance with activity based on assessment  - Request Rehabilitation consult to assist with strengthening/weightbearing, etc    Outcome: Progressing      Problem: Knowledge Deficit  Goal: Patient/family/caregiver demonstrates understanding of disease process, treatment plan, medications, and discharge instructions  Complete learning assessment and assess knowledge base    Interventions:  - Provide teaching at level of understanding  - Provide teaching via preferred learning methods   Outcome: Progressing      Problem: DISCHARGE PLANNING  Goal: Discharge to home or other facility with appropriate resources  INTERVENTIONS:  - Identify barriers to discharge w/patient and caregiver  - Arrange for needed discharge resources and transportation as appropriate  - Identify discharge learning needs (meds, wound care, etc )  - Arrange for interpretive services to assist at discharge as needed  - Refer to Case Management Department for coordinating discharge planning if the patient needs post-hospital services based on physician/advanced practitioner order or complex needs related to functional status, cognitive ability, or social support system   Outcome: Progressing

## 2018-02-25 VITALS
TEMPERATURE: 97.8 F | OXYGEN SATURATION: 98 % | SYSTOLIC BLOOD PRESSURE: 120 MMHG | WEIGHT: 140 LBS | DIASTOLIC BLOOD PRESSURE: 74 MMHG | HEART RATE: 68 BPM | BODY MASS INDEX: 21.22 KG/M2 | RESPIRATION RATE: 17 BRPM | HEIGHT: 68 IN

## 2018-02-25 RX ORDER — ACETAMINOPHEN 325 MG/1
325 TABLET ORAL EVERY 4 HOURS PRN
Qty: 30 TABLET | Refills: 0
Start: 2018-02-25

## 2018-02-25 RX ORDER — DOCUSATE SODIUM 100 MG/1
100 CAPSULE, LIQUID FILLED ORAL 2 TIMES DAILY PRN
Qty: 10 CAPSULE | Refills: 0
Start: 2018-02-25 | End: 2018-05-27

## 2018-02-25 RX ADMIN — IBUPROFEN 600 MG: 600 TABLET, FILM COATED ORAL at 05:58

## 2018-02-25 NOTE — PROGRESS NOTES
Postpartum Progress Note - OB/GYN   Ortiz Sutton 33 y o  female MRN: [id number]  Unit/Bed#: L&D 518-85 Encounter: [id number]    ASSESSMENT:  Lauren Hawkins 33 y o  [id number] s/p Spontaneous Vaginal Delivery Postpartum day  2  Pt is well appearing and with no current complaints  PLAN:  1) Continue routine postpartum care  2) Encourage ambulation  3) Pain control as needed  4) Advance diet as tolerated  5) Dispo: stable and comfortable, anticipate discharge    Subjective/Objective     SUBJECTIVE:  Pain: no  Tolerating Oral Intake: yes   Voiding: yes  Flatus: yes  Bowel Movement: no  Ambulating: yes  Breastfeeding: yes  Chest Pain: no  Shortness of Breath: no  Leg Pain/Discomfort: no  Lochia: minimal    OBJECTIVE:     Vitals: Blood pressure 136/74, pulse 68, temperature 98 4 °F (36 9 °C), temperature source Oral, resp  rate 12, height 5' 8" (1 727 m), weight 63 5 kg (140 lb), last menstrual period 05/19/2017, SpO2 98 %, currently breastfeeding       General: appears well, alert and oriented x 3, pleasant and cooperative  Cardiovascular: RRR, normal S1/S2, no GRM  Lungs:  clear to auscultation bilaterally; no WRR, non-labored breathing   Abdomen: normal bowel sounds, soft, no tenderness, no distention  : Uterine fundus firm: -2 cm below the umbilicus  Lower Extremities: Non-tender, peripheral pulses normal; no clubbing, cyanosis, or edema    Labs:   Lab Results   Component Value Date    WBC 12 16 (H) 02/23/2018    HGB 11 4 (L) 02/23/2018    HCT 34 1 (L) 02/23/2018    MCV 91 02/23/2018     02/23/2018       Medications:   Current Facility-Administered Medications   Medication Dose Route Frequency    acetaminophen (TYLENOL) tablet 650 mg  650 mg Oral Q4H PRN    aluminum-magnesium hydroxide-simethicone (MYLANTA) 200-200-20 mg/5 mL oral suspension 15 mL  15 mL Oral Q6H PRN    benzocaine-menthol-lanolin-aloe (DERMOPLAST) 20-0 5 % topical spray 1 application  1 application Topical 4x Daily PRN    calcium carbonate (TUMS) chewable tablet 1,000 mg  1,000 mg Oral Daily PRN    diphenhydrAMINE (BENADRYL) tablet 25 mg  25 mg Oral Q6H PRN    docusate sodium (COLACE) capsule 100 mg  100 mg Oral BID PRN    hydrocortisone 1 % cream 1 application  1 application Topical PRN    ibuprofen (MOTRIN) tablet 600 mg  600 mg Oral Q6H PRN    magnesium hydroxide (MILK OF MAGNESIA) 400 mg/5 mL oral suspension 30 mL  30 mL Oral Daily PRN    metoclopramide (REGLAN) injection 10 mg  10 mg Intravenous Q6H PRN    ondansetron (ZOFRAN) injection 4 mg  4 mg Intravenous Q8H PRN    oxyCODONE-acetaminophen (PERCOCET) 5-325 mg per tablet 1 tablet  1 tablet Oral Q4H PRN    oxyCODONE-acetaminophen (PERCOCET) 5-325 mg per tablet 2 tablet  2 tablet Oral Q4H PRN    Rho(D) immune globulin (RHOGAM ULTRA-FILTERED PLUS) IM injection 300 mcg  300 mcg Intramuscular Once    simethicone (MYLICON) chewable tablet 80 mg  80 mg Oral 4x Daily PRN    witch hazel-glycerin (TUCKS) topical pad 1 pad  1 pad Topical PRN     Invasive Devices          No matching active lines, drains, or airways               Alycia Potts MD PGY-2   2/25/2018 6:34 AM

## 2018-03-01 LAB — PLACENTA IN STORAGE: NORMAL

## 2018-03-29 ENCOUNTER — TRANSCRIBE ORDERS (OUTPATIENT)
Dept: ADMINISTRATIVE | Facility: HOSPITAL | Age: 33
End: 2018-03-29

## 2018-03-29 DIAGNOSIS — N63.0 LUMP OR MASS IN BREAST: Primary | ICD-10-CM

## 2018-03-30 ENCOUNTER — HOSPITAL ENCOUNTER (OUTPATIENT)
Dept: ULTRASOUND IMAGING | Facility: CLINIC | Age: 33
Discharge: HOME | End: 2018-03-30
Payer: COMMERCIAL

## 2018-03-30 DIAGNOSIS — N63.0 LUMP OR MASS IN BREAST: ICD-10-CM

## 2018-03-30 PROCEDURE — 76642 ULTRASOUND BREAST LIMITED: CPT

## 2018-05-27 ENCOUNTER — HOSPITAL ENCOUNTER (EMERGENCY)
Facility: HOSPITAL | Age: 33
Discharge: HOME | End: 2018-05-27
Attending: EMERGENCY MEDICINE | Admitting: EMERGENCY MEDICINE
Payer: COMMERCIAL

## 2018-05-27 VITALS
RESPIRATION RATE: 18 BRPM | WEIGHT: 141 LBS | HEIGHT: 68 IN | HEART RATE: 88 BPM | SYSTOLIC BLOOD PRESSURE: 122 MMHG | BODY MASS INDEX: 21.37 KG/M2 | DIASTOLIC BLOOD PRESSURE: 74 MMHG | OXYGEN SATURATION: 99 % | TEMPERATURE: 97.6 F

## 2018-05-27 DIAGNOSIS — N60.12 CHRONIC MASTITIS OF LEFT BREAST: Primary | ICD-10-CM

## 2018-05-27 PROCEDURE — 99283 EMERGENCY DEPT VISIT LOW MDM: CPT

## 2018-05-27 RX ORDER — AMOXICILLIN AND CLAVULANATE POTASSIUM 875; 125 MG/1; MG/1
1 TABLET, FILM COATED ORAL EVERY 12 HOURS
Qty: 14 TABLET | Refills: 0 | Status: SHIPPED | OUTPATIENT
Start: 2018-05-27 | End: 2018-06-04 | Stop reason: SDUPTHER

## 2018-05-27 RX ORDER — NAPROXEN 500 MG/1
500 TABLET ORAL 2 TIMES DAILY WITH MEALS
Qty: 15 TABLET | Refills: 0 | Status: SHIPPED | OUTPATIENT
Start: 2018-05-27 | End: 2018-06-07 | Stop reason: SDUPTHER

## 2018-05-27 NOTE — DISCHARGE INSTRUCTIONS
Mastitis   WHAT YOU NEED TO KNOW:   Mastitis is an infection of breast tissue that most often occurs in women who breastfeed  It can happen any time during breastfeeding, but usually occurs within the first 3 months after giving birth  Usually only one breast is affected  DISCHARGE INSTRUCTIONS:   Contact your healthcare provider if:   · Your symptoms do not get better within 2 days  · You have a painful lump in your breast      · You have swollen and tender lymph nodes in your armpit on the same side as the affected breast     · You have questions or concerns about your condition or care  Medicines: You may need any of the following:  · Antibiotics  help treat or prevent a bacterial infection  · Acetaminophen  decreases pain and fever  It is available without a doctor's order  Ask how much to take and how often to take it  Follow directions  Acetaminophen can cause liver damage if not taken correctly  · NSAIDs , such as ibuprofen, help decrease swelling, pain, and fever  This medicine is available with or without a doctor's order  NSAIDs can cause stomach bleeding or kidney problems in certain people  If you take blood thinner medicine, always ask your healthcare provider if NSAIDs are safe for you  Always read the medicine label and follow directions  · Take your medicine as directed  Contact your healthcare provider if you think your medicine is not helping or if you have side effects  Tell him or her if you are allergic to any medicine  Keep a list of the medicines, vitamins, and herbs you take  Include the amounts, and when and why you take them  Bring the list or the pill bottles to follow-up visits  Carry your medicine list with you in case of an emergency  Manage your symptoms:   · Continue to breastfeed from the affected breast   This will help to prevent an abscess from forming   Breastfeed your baby on the affected side first  Apply a warm, wet cloth on your breast or take a warm shower before you feed your baby  This can help increase your milk flow  If it is painful when you breastfeed from the affected breast, feed your baby from the other breast first  Pump the affected side to completely drain your breast after breastfeeding, if needed  You may save the pumped milk to feed your baby  · Use different positions to breastfeed  Change the position of your baby during feedings  This may help to relieve your discomfort  · Apply heat on your breast for 20 to 30 minutes every 2 hours for as many days as directed  Heat helps decrease pain  · Apply ice after feedings  Apply ice on your breast for 15 to 20 minutes every hour or as directed  Use an ice pack, or put crushed ice in a plastic bag  Cover it with a towel  Ice helps prevent tissue damage and decreases swelling and pain  · Massage your breast   Gently massage your breast before and during breastfeeding to help drain your milk  · Drink liquids as directed  Ask how much liquid to drink each day and which liquids are best for you  · Rest as needed  Do not sleep on your stomach until your infection is gone  Prevent mastitis:   · Breastfeed every 2 or 3 hours to prevent engorgement  Breast engorgement develops when too much milk builds up in your breast  Take your time when you breastfeed to allow your baby to empty your breast  Try not to switch breasts too early  Express or pump after you breastfeed if your baby is not emptying your breasts when he feeds  · Prevent sore and cracked nipples  A good latch prevents sore and cracked nipples  If you have sore nipples after breastfeeding, your baby may not be latched on properly  Gently break suction and reposition if your baby is only sucking on the nipple  Talk to a lactation consultant if you need help with your baby's latch  · Care for your breasts  Keep your nipples clean and dry between feedings   Check them for cracks, blisters, or other irritated areas  Ask a lactation specialist or your healthcare provider how to treat sore and cracked nipples  Wash your hands before and after you breastfeed your baby or pump your breasts  Wear a comfortable nursing bra that supports your breasts but is not too tight  Follow up with your healthcare provider as directed:  Write down your questions so you remember to ask them during your visits  © 2017 [employer]  Information is for End User's use only and may not be sold, redistributed or otherwise used for commercial purposes  All illustrations and images included in CareNotes® are the copyrighted property of [employer] , [employer]  or [employer]  The above information is an  only  It is not intended as medical advice for individual conditions or treatments  Talk to your doctor, nurse or pharmacist before following any medical regimen to see if it is safe and effective for you

## 2018-05-28 NOTE — ED ATTENDING ATTESTATION
Yarelis Okeefe MD, saw and evaluated the patient  I have discussed the patient with the resident/non-physician practitioner and agree with the resident's/non-physician practitioner's findings, Plan of Care, and MDM as documented in the resident's/non-physician practitioner's note, except where noted  All available labs and Radiology studies were reviewed  At this point I agree with the current assessment done in the Emergency Department  I have conducted an independent evaluation of this patient a history and physical is as follows:     this is a 26-year-old female presents with recurrent mastitis  Patient is currently being treated with Augmentin, on day six of seven for left sided mastitis  Patient states she does no longer have fevers or body aches  Patient however continues to have breast pain  Patient has a known fibroadenoma that has been previously biopsy  Patient states however during pregnancy became much larger and is now causing significant pain  Patient denies any fevers, drainage  On exam patient is well appearing with no obvious cellulitis  Patient is tender to palpation in the approximately 3:00 position on the left breast   Patient has had multiple ultrasounds as an outpatient for this  We did a bedside ultrasound today that did not demonstrate any fluid collection  Patient will be referred to breast doctor for further management of this, will also give one week of Augmentin in addition to the week she has already taken  Also have recommended warm compresses to soften the area  Patient discharged home    Critical Care Time  CritCare Time    Procedures

## 2018-06-02 ENCOUNTER — HOSPITAL ENCOUNTER (EMERGENCY)
Facility: HOSPITAL | Age: 33
Discharge: HOME | End: 2018-06-02
Attending: EMERGENCY MEDICINE
Payer: COMMERCIAL

## 2018-06-02 ENCOUNTER — OFFICE VISIT (OUTPATIENT)
Dept: URGENT CARE | Age: 33
End: 2018-06-02

## 2018-06-02 VITALS
SYSTOLIC BLOOD PRESSURE: 134 MMHG | RESPIRATION RATE: 22 BRPM | BODY MASS INDEX: 21.37 KG/M2 | DIASTOLIC BLOOD PRESSURE: 78 MMHG | TEMPERATURE: 98 F | OXYGEN SATURATION: 99 % | HEART RATE: 80 BPM | WEIGHT: 141 LBS | HEIGHT: 68 IN

## 2018-06-02 VITALS
DIASTOLIC BLOOD PRESSURE: 66 MMHG | SYSTOLIC BLOOD PRESSURE: 121 MMHG | OXYGEN SATURATION: 99 % | TEMPERATURE: 98.1 F | BODY MASS INDEX: 21.38 KG/M2 | RESPIRATION RATE: 18 BRPM | WEIGHT: 141.09 LBS | HEIGHT: 68 IN | HEART RATE: 78 BPM

## 2018-06-02 DIAGNOSIS — N60.12 CHRONIC MASTITIS OF LEFT BREAST: ICD-10-CM

## 2018-06-02 DIAGNOSIS — N61.0 MASTITIS: Primary | ICD-10-CM

## 2018-06-02 DIAGNOSIS — D24.2 FIBROADENOMA OF LEFT BREAST: Primary | ICD-10-CM

## 2018-06-02 PROCEDURE — 99283 EMERGENCY DEPT VISIT LOW MDM: CPT

## 2018-06-02 RX ORDER — ACETAMINOPHEN 325 MG/1
975 TABLET ORAL ONCE
Status: COMPLETED | OUTPATIENT
Start: 2018-06-02 | End: 2018-06-02

## 2018-06-02 RX ORDER — OXYCODONE HYDROCHLORIDE 5 MG/1
5 TABLET ORAL EVERY 4 HOURS PRN
Qty: 11 TABLET | Refills: 0 | Status: SHIPPED | OUTPATIENT
Start: 2018-06-02 | End: 2018-06-07 | Stop reason: SDUPTHER

## 2018-06-02 RX ORDER — NAPROXEN 500 MG/1
500 TABLET ORAL ONCE
Status: COMPLETED | OUTPATIENT
Start: 2018-06-02 | End: 2018-06-02

## 2018-06-02 RX ADMIN — NAPROXEN 500 MG: 500 TABLET ORAL at 10:27

## 2018-06-02 RX ADMIN — ACETAMINOPHEN 975 MG: 325 TABLET ORAL at 10:27

## 2018-06-02 NOTE — DISCHARGE INSTRUCTIONS
Mastitis   WHAT YOU NEED TO KNOW:   Mastitis is an infection of breast tissue that most often occurs in women who breastfeed  It can happen any time during breastfeeding, but usually occurs within the first 3 months after giving birth  Usually only one breast is affected  DISCHARGE INSTRUCTIONS:   Contact your healthcare provider if:   · Your symptoms do not get better within 2 days  · You have a painful lump in your breast      · You have swollen and tender lymph nodes in your armpit on the same side as the affected breast     · You have questions or concerns about your condition or care  Medicines: You may need any of the following:  · Antibiotics  help treat or prevent a bacterial infection  · Acetaminophen  decreases pain and fever  It is available without a doctor's order  Ask how much to take and how often to take it  Follow directions  Acetaminophen can cause liver damage if not taken correctly  · NSAIDs , such as ibuprofen, help decrease swelling, pain, and fever  This medicine is available with or without a doctor's order  NSAIDs can cause stomach bleeding or kidney problems in certain people  If you take blood thinner medicine, always ask your healthcare provider if NSAIDs are safe for you  Always read the medicine label and follow directions  · Take your medicine as directed  Contact your healthcare provider if you think your medicine is not helping or if you have side effects  Tell him or her if you are allergic to any medicine  Keep a list of the medicines, vitamins, and herbs you take  Include the amounts, and when and why you take them  Bring the list or the pill bottles to follow-up visits  Carry your medicine list with you in case of an emergency  Manage your symptoms:   · Continue to breastfeed from the affected breast   This will help to prevent an abscess from forming   Breastfeed your baby on the affected side first  Apply a warm, wet cloth on your breast or take a warm shower before you feed your baby  This can help increase your milk flow  If it is painful when you breastfeed from the affected breast, feed your baby from the other breast first  Pump the affected side to completely drain your breast after breastfeeding, if needed  You may save the pumped milk to feed your baby  · Use different positions to breastfeed  Change the position of your baby during feedings  This may help to relieve your discomfort  · Apply heat on your breast for 20 to 30 minutes every 2 hours for as many days as directed  Heat helps decrease pain  · Apply ice after feedings  Apply ice on your breast for 15 to 20 minutes every hour or as directed  Use an ice pack, or put crushed ice in a plastic bag  Cover it with a towel  Ice helps prevent tissue damage and decreases swelling and pain  · Massage your breast   Gently massage your breast before and during breastfeeding to help drain your milk  · Drink liquids as directed  Ask how much liquid to drink each day and which liquids are best for you  · Rest as needed  Do not sleep on your stomach until your infection is gone  Prevent mastitis:   · Breastfeed every 2 or 3 hours to prevent engorgement  Breast engorgement develops when too much milk builds up in your breast  Take your time when you breastfeed to allow your baby to empty your breast  Try not to switch breasts too early  Express or pump after you breastfeed if your baby is not emptying your breasts when he feeds  · Prevent sore and cracked nipples  A good latch prevents sore and cracked nipples  If you have sore nipples after breastfeeding, your baby may not be latched on properly  Gently break suction and reposition if your baby is only sucking on the nipple  Talk to a lactation consultant if you need help with your baby's latch  · Care for your breasts  Keep your nipples clean and dry between feedings   Check them for cracks, blisters, or other irritated areas  Ask a lactation specialist or your healthcare provider how to treat sore and cracked nipples  Wash your hands before and after you breastfeed your baby or pump your breasts  Wear a comfortable nursing bra that supports your breasts but is not too tight  Follow up with your healthcare provider as directed:  Write down your questions so you remember to ask them during your visits  © 2017 [employer]  Information is for End User's use only and may not be sold, redistributed or otherwise used for commercial purposes  All illustrations and images included in CareNotes® are the copyrighted property of [employer]  or [employer]  The above information is an  only  It is not intended as medical advice for individual conditions or treatments  Talk to your doctor, nurse or pharmacist before following any medical regimen to see if it is safe and effective for you

## 2018-06-02 NOTE — PATIENT INSTRUCTIONS
Patient go to [facility] emergency room for further evaluation  Patient is stable to go by car

## 2018-06-02 NOTE — ED ATTENDING ATTESTATION
Esther Bourgeois MD, saw and evaluated the patient  All available labs and X-rays were ordered by me or the resident and have been reviewed by myself  I discussed the patient with the resident / non-physician and agree with the resident's / non-physician practitioner's findings and plan as documented in the resident's / non-physician practicitioner's note, except where noted  At this point, I agree with the current assessment done in the ED  Chief Complaint   Patient presents with    Breast Pain     Patient currently on antibiotics for mastitis, pain and redness in left breast worse in last 24 hours       This is a 52-year-old female presenting for evaluation of cellulitis of left breast   The patient states that she has a history of mastitis of the same breast, started on Augmentin about 14 days ago  She is currently on day 12 of 14 on the Augmentin  She had fevers initially but those have since resolved  In the last 24 hr though she has been noticing increasing pain and redness of the left breast   She has an appointment on Monday with Dr Ginette Patel due to this for possible management  She does not have any nausea or vomiting or chest pain or shortness of breath  She does have discomfort when laying down especially when the left breast is touching the bed  She is able to wear sure though she is able to tolerate wearing a bra  PMH:  - Anxiety/Depression  - Irregular heart beeat  PSH:  - ASD repair  - Breast surgery: implants + removal  - Ablation  - Colposcopy  Former smoker  No alcohol/drugs  PE:  Vitals:    06/02/18 0917   BP: 121/66   BP Location: Left arm   Pulse: 78   Resp: 18   Temp: 98 1 °F (36 7 °C)   TempSrc: Tympanic   SpO2: 99%   Weight: 64 kg (141 lb 1 5 oz)   Height: 5' 8" (1 727 m)   General: VSS, NAD, awake, alert  Well-nourished, well-developed  Appears stated age  Speaking normally in full sentences  Head: Normocephalic, atraumatic, nontender  Eyes: PERRL, EOM-I  No diplopia  No hyphema  No subconjunctival hemorrhages  Symmetrical lids  ENT: Atraumatic external nose and ears  MMM  No malocclusion  No stridor  Normal phonation  No drooling  Normal swallowing  Neck: Symmetric, trachea midline  No JVD  CV: RRR  +S1/S2  No murmurs or gallops  Peripheral pulses +2 throughout  No chest wall tenderness  Lungs:   Unlabored No retractions  CTAB, lungs sounds equal bilateral    No tachypnea  Abd: +BS, soft, NT/ND    MSK:   FROM   Back:   No rashes  Skin: Dry  Left breast has a palpable mass that is about 2 in in diameter  There is no overlying cellulitis  It is red, blanches when pressed  It does go towards the 2-5 o'clock region of the left breast   There is a small area of extension down to 7 o'clock  It feels a little bit warm  There is no leakage from the nipple  Neuro: AAOx3, GCS 15, CN II-XII grossly intact  Motor grossly intact  Psychiatric/Behavioral: Appropriate mood and affect   Exam: deferred  A:  - Left breast cellulitis  - fibroadenoma  P:  - continue abx she is on  - breast surgeon f/u for scheduled appointment in 48 hours  - supportive measures  - 13 point ROS was performed and all are normal unless stated in the history above  - Nursing note reviewed  Vitals reviewed  - Orders placed by myself and/or advanced practitioner / resident     - Previous chart was not reviewed  - No language barrier    - History obtained from patient  - There are no limitations to the history obtained  - Critical care time: Not applicable for this patient  Final Diagnosis:  1  Fibroadenoma of left breast    2  Chronic mastitis of left breast         Medications   acetaminophen (TYLENOL) tablet 975 mg (975 mg Oral Given 6/2/18 1027)   naproxen (NAPROSYN) tablet 500 mg (500 mg Oral Given 6/2/18 1027)     No orders to display     No orders of the defined types were placed in this encounter      Labs Reviewed - No data to display  Time reflects when diagnosis was documented in both MDM as applicable and the Disposition within this note     Time User Action Codes Description Comment    6/2/2018 10:32 AM Montserrat Slimmer Add [D24 2] Fibroadenoma of left breast     6/2/2018 10:32 AM Montserrat Slimmer Add [N60 12] Chronic mastitis of left breast       ED Disposition     ED Disposition Condition Comment    Discharge  [address] [place], discharge to home/self care  Condition at discharge: Stable        Follow-up Information     Follow up With Specialties Details Why Contact Info    Emmanuelle Evans MD General Surgery, Surgical Oncology Go on 6/4/2018  [address] [facility]  [phone]          Discharge Medication List as of 6/2/2018 10:36 AM      START taking these medications    Details   oxyCODONE (ROXICODONE) 5 mg immediate release tablet Take 1 tablet (5 mg total) by mouth every 4 (four) hours as needed for severe pain, Starting Sat 6/2/2018, Print         CONTINUE these medications which have NOT CHANGED    Details   acetaminophen (TYLENOL) 325 mg tablet Take 1 tablet (325 mg total) by mouth every 4 (four) hours as needed for mild pain, Starting Sun 2/25/2018, No Print      ibuprofen (MOTRIN) 200 mg tablet Take 200 mg by mouth every 6 (six) hours as needed for mild pain, Until Discontinued, Historical Med      LINOLEIC ACID-SUNFLOWER OIL PO Take by mouth, Historical Med      naproxen (NAPROSYN) 500 mg tablet Take 1 tablet (500 mg total) by mouth 2 (two) times a day with meals, Starting Sun 5/27/2018, Print      Probiotic Product (PROBIOTIC PO) Take by mouth, Until Discontinued, Historical Med      amoxicillin-clavulanate (AUGMENTIN) 875-125 mg per tablet Take 1 tablet by mouth every 12 (twelve) hours for 7 days, Starting Sun 5/27/2018, Until Sun 6/3/2018, Print           No discharge procedures on file  Prior to Admission Medications   Prescriptions Last Dose Informant Patient Reported? Taking?    LINOLEIC ACID-SUNFLOWER OIL PO   Yes Yes   Sig: Take by mouth   Probiotic Product (PROBIOTIC PO)   Yes Yes   Sig: Take by mouth   acetaminophen (TYLENOL) 325 mg tablet   No Yes   Sig: Take 1 tablet (325 mg total) by mouth every 4 (four) hours as needed for mild pain   ibuprofen (MOTRIN) 200 mg tablet  Self Yes Yes   Sig: Take 200 mg by mouth every 6 (six) hours as needed for mild pain   naproxen (NAPROSYN) 500 mg tablet   No Yes   Sig: Take 1 tablet (500 mg total) by mouth 2 (two) times a day with meals      Facility-Administered Medications: None       Portions of the record may have been created with voice recognition software  Occasional wrong word or "sound a like" substitutions may have occurred due to the inherent limitations of voice recognition software  Read the chart carefully and recognize, using context, where substitutions have occurred      Electronically signed by:  Bret Limon

## 2018-06-02 NOTE — ED PROVIDER NOTES
History  Chief Complaint   Patient presents with    Breast Pain     Patient currently on antibiotics for mastitis, pain and redness in left breast worse in last 24 hours     This is a 33 y o  old female who presents to the ED for evaluation of breast pain  No history of fibroadenoma the left breast   Chronic mastitis and Augmentin day 12/14  Previously was having fevers now resolved  Pain is increased since yesterday  Has been using Naprosyn and Tylenol with mild-moderate relief however last night the Naprosyn was not helping  Worse with palpation, no remitting factor  Erythema persists  Difficulty sleeping  No drainage  Scheduled to see breast surgery monday 14w postpartum and is no longer breat feeding due to the pain and swelling of the L breast   Otherwise, patient denies fevers, chills, night sweats, cough, congestion, rhinorrhea, CP, dyspnea, abdominal pain, nausea, vomiting, diarrhea, constipation, urinary symptoms, leg pain or swelling  Prior to Admission Medications   Prescriptions Last Dose Informant Patient Reported? Taking?    LINOLEIC ACID-SUNFLOWER OIL PO   Yes Yes   Sig: Take by mouth   Probiotic Product (PROBIOTIC PO)   Yes Yes   Sig: Take by mouth   acetaminophen (TYLENOL) 325 mg tablet   No Yes   Sig: Take 1 tablet (325 mg total) by mouth every 4 (four) hours as needed for mild pain   amoxicillin-clavulanate (AUGMENTIN) 875-125 mg per tablet   No Yes   Sig: Take 1 tablet by mouth every 12 (twelve) hours for 7 days   ibuprofen (MOTRIN) 200 mg tablet  Self Yes Yes   Sig: Take 200 mg by mouth every 6 (six) hours as needed for mild pain   naproxen (NAPROSYN) 500 mg tablet   No Yes   Sig: Take 1 tablet (500 mg total) by mouth 2 (two) times a day with meals      Facility-Administered Medications: None     Past Medical History:   Diagnosis Date    Anxiety     Depression     Irregular heart beat      Past Surgical History:   Procedure Laterality Date    ASD REPAIR      BREAST SURGERY Bilateral     implants and removal    CARDIAC ELECTROPHYSIOLOGY STUDY AND ABLATION      MS COLPOSCOPY,CERVIX W/ADJ VAG,W/LOOP BX N/A 6/8/2017    Procedure: BIOPSY LEEP CERVIX;  Surgeon: Jorden Painter DO;  Location: [facility];  Service: Gynecology     History reviewed  No pertinent family history  I have reviewed and agree with the history as documented  Social History   Substance Use Topics    Smoking status: Former Smoker     Quit date: 5/29/2017    Smokeless tobacco: Never Used    Alcohol use Yes      Comment: occ      Review of Systems   Constitutional: Negative for chills, fatigue, fever and unexpected weight change  HENT: Negative for congestion, rhinorrhea and sore throat  Eyes: Negative for redness and visual disturbance  Respiratory: Negative for cough and shortness of breath  Cardiovascular: Negative for chest pain and leg swelling  Gastrointestinal: Negative for abdominal pain, constipation, diarrhea, nausea and vomiting  Endocrine: Negative for cold intolerance and heat intolerance  Genitourinary: Negative for dysuria, frequency and urgency  Musculoskeletal: Negative for back pain  Skin: Negative for rash  Neurological: Negative for dizziness, syncope and numbness  All other systems reviewed and are negative  Physical Exam  ED Triage Vitals [06/02/18 0917]   Temperature Pulse Respirations Blood Pressure SpO2   98 1 °F (36 7 °C) 78 18 121/66 99 %      Temp Source Heart Rate Source Patient Position - Orthostatic VS BP Location FiO2 (%)   Tympanic Monitor Sitting Left arm --      Pain Score       9         Physical Exam   Constitutional: She is oriented to person, place, and time  She appears well-developed and well-nourished  No distress  HENT:   Head: Normocephalic and atraumatic  Nose: Nose normal    Mouth/Throat: No oropharyngeal exudate  Eyes: Conjunctivae and EOM are normal  Pupils are equal, round, and reactive to light  Neck: Normal range of motion  Neck supple  Cardiovascular: Normal rate, regular rhythm and normal heart sounds  Exam reveals no gallop  No murmur heard  Pulmonary/Chest: Effort normal and breath sounds normal  She has no wheezes  She exhibits no tenderness  Abdominal: Soft  Bowel sounds are normal  She exhibits no distension  There is no tenderness  There is no rebound and no guarding  Musculoskeletal: Normal range of motion  She exhibits no tenderness or deformity  Lymphadenopathy:     She has no cervical adenopathy  Neurological: She is alert and oriented to person, place, and time  No cranial nerve deficit  Skin: Skin is warm and dry  No rash noted  She is not diaphoretic  No erythema  Psychiatric: She has a normal mood and affect  Nursing note and vitals reviewed  ED Medications  Medications   acetaminophen (TYLENOL) tablet 975 mg (975 mg Oral Given 6/2/18 1027)   naproxen (NAPROSYN) tablet 500 mg (500 mg Oral Given 6/2/18 1027)     Diagnostic Studies  Results Reviewed     None        No orders to display     Procedures  Procedures    Phone Consults  ED Phone Contact    ED Course     A/P: This is a 33 y o  female who presents to the ED for evaluation of breast pain  Bedside ultrasound shows fiber cystic mass over no evidence of abscess that I am able to see that would be amenable to incision and drainage/needle aspiration  Recommend continuing Augmentin as patient has not had recurrent fevers  Will give additional pain medications so she can follow up with breast surgery on Monday  I personally discussed return precautions with this patient  I provided the patient with written discharge instructions and particularly highlighted specific areas of interest to this patient, including but not limited to: medications for symptom managment, follow up recommendations, and return precautions, notably worsening infection  Patient is in agreement with this plan as outlined above      TAHIR Michelle Time    Disposition  Final diagnoses:   Fibroadenoma of left breast   Chronic mastitis of left breast     Time reflects when diagnosis was documented in both MDM as applicable and the Disposition within this note     Time User Action Codes Description Comment    6/2/2018 10:32 AM Pipo Slider Add [D24 2] Fibroadenoma of left breast     6/2/2018 10:32 AM Pipo Slider Add [N60 12] Chronic mastitis of left breast       ED Disposition     ED Disposition Condition Comment    Discharge  [address], discharge to home/self care  Condition at discharge: Stable        Follow-up Information     Follow up With Specialties Details Why Contact Info    Creig Halsted, MD General Surgery, Surgical Oncology Go on 6/4/2018  [facility]  [phone]          Discharge Medication List as of 6/2/2018 10:36 AM      START taking these medications    Details   oxyCODONE (ROXICODONE) 5 mg immediate release tablet Take 1 tablet (5 mg total) by mouth every 4 (four) hours as needed for severe pain, Starting Sat 6/2/2018, Print         CONTINUE these medications which have NOT CHANGED    Details   acetaminophen (TYLENOL) 325 mg tablet Take 1 tablet (325 mg total) by mouth every 4 (four) hours as needed for mild pain, Starting Sun 2/25/2018, No Print      amoxicillin-clavulanate (AUGMENTIN) 875-125 mg per tablet Take 1 tablet by mouth every 12 (twelve) hours for 7 days, Starting Sun 5/27/2018, Until Sun 6/3/2018, Print      ibuprofen (MOTRIN) 200 mg tablet Take 200 mg by mouth every 6 (six) hours as needed for mild pain, Until Discontinued, Historical Med      LINOLEIC ACID-SUNFLOWER OIL PO Take by mouth, Historical Med      naproxen (NAPROSYN) 500 mg tablet Take 1 tablet (500 mg total) by mouth 2 (two) times a day with meals, Starting Sun 5/27/2018, Print      Probiotic Product (PROBIOTIC PO) Take by mouth, Until Discontinued, Historical Med           No discharge procedures on file      ED Provider  Attending physically available and evaluated Reina Richardson I managed the patient along with the ED Attending      Electronically Signed by         Yovani Ponce MD  06/02/18 6620

## 2018-06-02 NOTE — PROGRESS NOTES
[facility]        NAME: Shadia Morrison is a 33 y o  female  : 1985    MRN: [id number]  DATE: 2018  TIME: 8:29 AM    Assessment and Plan   Mastitis [N61 0]  1  Mastitis           Patient Instructions       Follow up with PCP in 3-5 days  Proceed to  ER if symptoms worsen  Chief Complaint     Chief Complaint   Patient presents with    Breast Pain     area is red and warm to touch  Pt is on a abt augmentin twice daily since 18  Symptoms getting worse  9/10 on pain scale  History of Present Illness       Patient here for evaluation mastitis  Patient was seen originally by her OBGYN was put on Augmentin  Patient went to the emergency room on  and had an ultrasound to evaluate for possible abscess  The ultrasound was negative  Patient was placed on a long course of Augmentin and is on day 12 of antibiotics  She states that over the last 24 hours it has gotten worse with more pain and redness  Patient has an appointment with the surgeon on Monday the   Review of Systems   Review of Systems   Constitutional: Negative            Current Medications       Current Outpatient Prescriptions:     acetaminophen (TYLENOL) 325 mg tablet, Take 1 tablet (325 mg total) by mouth every 4 (four) hours as needed for mild pain, Disp: 30 tablet, Rfl: 0    amoxicillin-clavulanate (AUGMENTIN) 875-125 mg per tablet, Take 1 tablet by mouth every 12 (twelve) hours for 7 days, Disp: 14 tablet, Rfl: 0    Amoxicillin-Pot Clavulanate (AUGMENTIN PO), Take by mouth 2 (two) times a day, Disp: , Rfl:     ibuprofen (MOTRIN) 200 mg tablet, Take 200 mg by mouth every 6 (six) hours as needed for mild pain, Disp: , Rfl:     LINOLEIC ACID-SUNFLOWER OIL PO, Take by mouth, Disp: , Rfl:     naproxen (NAPROSYN) 500 mg tablet, Take 1 tablet (500 mg total) by mouth 2 (two) times a day with meals, Disp: 15 tablet, Rfl: 0    Probiotic Product (PROBIOTIC PO), Take by mouth, Disp: , Rfl: Current Allergies     Allergies as of 06/02/2018    (No Known Allergies)            The following portions of the patient's history were reviewed and updated as appropriate: allergies, current medications, past family history, past medical history, past social history, past surgical history and problem list      Past Medical History:   Diagnosis Date    Anxiety     Depression     Irregular heart beat        Past Surgical History:   Procedure Laterality Date    ASD REPAIR      BREAST SURGERY Bilateral     implants and removal    CARDIAC ELECTROPHYSIOLOGY STUDY AND ABLATION      NE COLPOSCOPY,CERVIX W/ADJ VAG,W/LOOP BX N/A 6/8/2017    Procedure: BIOPSY LEEP CERVIX;  Surgeon: Tucker Alfred DO;  Location: [facility];  Service: Gynecology       No family history on file  Medications have been verified  Objective   /78 (BP Location: Left arm, Patient Position: Sitting, Cuff Size: Standard)   Pulse 80   Temp 98 °F (36 7 °C) (Temporal)   Resp 22   Ht 5' 8" (1 727 m)   Wt 64 kg (141 lb)   SpO2 99%   BMI 21 44 kg/m²        Physical Exam     Physical Exam   Constitutional: She is oriented to person, place, and time  She appears well-developed and well-nourished  Pulmonary/Chest:   Breast exam deferred at this time as patient is being referred to the emergency room for further evaluation due to worsening symptoms   Neurological: She is alert and oriented to person, place, and time  Psychiatric: She has a normal mood and affect  Her behavior is normal    Nursing note and vitals reviewed

## 2018-06-04 ENCOUNTER — OFFICE VISIT (OUTPATIENT)
Dept: SURGICAL ONCOLOGY | Facility: CLINIC | Age: 33
End: 2018-06-04
Payer: COMMERCIAL

## 2018-06-04 ENCOUNTER — ULTRASOUND (OUTPATIENT)
Dept: SURGICAL ONCOLOGY | Facility: CLINIC | Age: 33
End: 2018-06-04

## 2018-06-04 VITALS
HEART RATE: 87 BPM | DIASTOLIC BLOOD PRESSURE: 80 MMHG | SYSTOLIC BLOOD PRESSURE: 102 MMHG | RESPIRATION RATE: 18 BRPM | WEIGHT: 141 LBS | HEIGHT: 68 IN | BODY MASS INDEX: 21.37 KG/M2 | TEMPERATURE: 98.3 F

## 2018-06-04 DIAGNOSIS — N61.1 LEFT BREAST ABSCESS: Primary | ICD-10-CM

## 2018-06-04 DIAGNOSIS — N60.12 CHRONIC MASTITIS OF LEFT BREAST: ICD-10-CM

## 2018-06-04 PROBLEM — F32.A DEPRESSIVE DISORDER: Status: ACTIVE | Noted: 2018-06-04

## 2018-06-04 PROBLEM — Z67.41 TYPE O BLOOD, RH NEGATIVE: Status: ACTIVE | Noted: 2017-06-21

## 2018-06-04 PROBLEM — Z14.8 CARRIER OF GENETIC DISORDER: Status: ACTIVE | Noted: 2017-08-28

## 2018-06-04 PROBLEM — F41.1 ANXIETY STATE: Status: ACTIVE | Noted: 2018-06-04

## 2018-06-04 PROBLEM — N64.4 MASTODYNIA, FEMALE: Status: ACTIVE | Noted: 2018-06-04

## 2018-06-04 PROBLEM — Q95.0: Status: ACTIVE | Noted: 2017-08-30

## 2018-06-04 PROCEDURE — 87070 CULTURE OTHR SPECIMN AEROBIC: CPT | Performed by: NURSE PRACTITIONER

## 2018-06-04 PROCEDURE — 76942 ECHO GUIDE FOR BIOPSY: CPT | Performed by: SURGERY

## 2018-06-04 PROCEDURE — 87075 CULTR BACTERIA EXCEPT BLOOD: CPT | Performed by: NURSE PRACTITIONER

## 2018-06-04 PROCEDURE — 87077 CULTURE AEROBIC IDENTIFY: CPT | Performed by: NURSE PRACTITIONER

## 2018-06-04 PROCEDURE — 87205 SMEAR GRAM STAIN: CPT | Performed by: NURSE PRACTITIONER

## 2018-06-04 PROCEDURE — 87186 SC STD MICRODIL/AGAR DIL: CPT | Performed by: NURSE PRACTITIONER

## 2018-06-04 PROCEDURE — 99204 OFFICE O/P NEW MOD 45 MIN: CPT | Performed by: NURSE PRACTITIONER

## 2018-06-04 PROCEDURE — 19000 PUNCTURE ASPIR CYST BREAST: CPT | Performed by: SURGERY

## 2018-06-04 PROCEDURE — 76642 ULTRASOUND BREAST LIMITED: CPT | Performed by: SURGERY

## 2018-06-04 RX ORDER — AMOXICILLIN AND CLAVULANATE POTASSIUM 875; 125 MG/1; MG/1
1 TABLET, FILM COATED ORAL EVERY 12 HOURS
Qty: 14 TABLET | Refills: 0 | Status: SHIPPED | OUTPATIENT
Start: 2018-06-04 | End: 2018-06-11

## 2018-06-04 NOTE — PROGRESS NOTES
Surgical Oncology Follow Up       [address] [facility] [place] [facility]GICAL ONCOLOGY MISAELOSWALDO Saleem  1985  [id number]  [facility] SURGICAL ONCOLOGY [place]  [address] [place] [address]    Chief Complaint   Patient presents with    Breast Pain     Pt is here for initial consultation        Assessment/Plan:  1  Left breast abscess    - Wound culture and Gram stain  - Anaerobic culture and Gram stain  - amoxicillin-clavulanate (AUGMENTIN) 875-125 mg per tablet; Take 1 tablet by mouth every 12 (twelve) hours for 7 days  Dispense: 14 tablet; Refill: 0  - f/u appt with Dr Bill Bravo on 6/7  - Will call with culture results/ may change antibiotics at that time      Discussion/Summary:    Patient is a 33year old female who is 14 weeks post partum who presents today with complaints of left breast pain and redness  She has a history of a left breast fibroadenoma and she started to have episodes of recurrent left breast mastitis about one month post partum, while she was breast feeding  She has been treated with several rounds of antibiotics for mastitis and is  scheduled to take her last dose of a 14 day course of Augmentin today  She stopped breastfeeding in mid May  She presented to the ED on 6/2 for severe left breast pain and states and u/s was obtained which did not reveal an abscess  An u/s was performed by Dr Bill Bravo in the office today and approximately 50-60 ml of purulent/serosanguinous fluid was aspirated  The patient tolerated this well  We will renew her Augmentin for another 7 days  Cultures sent- will call patient if antibiotics need to be changed after culture report  Patient educated that she may develop another fever tonight and she should take Tylenol if this occurs   She can continue to use NSAIDS for pain relief and can apply warm compresses to the left breast  Dr Bill Bravo will see the patient back on 6/7 for a f/u visit  The patient did state she is interested in undergoing a left breast excision of fibroadenoma, as she feels that it the cause of her recurrent mastitis and she is considering having more children which she would like to breastfeed  She understands that she needs to be well healed from her infection prior to planning that surgery  She was instructed to call with any new concerns or symptoms  All of her questions were answered  History of Present Illness:     12/21/2015: Left breast 3:00 biopsy- fibroadenoma     -Interval History:     Patient is a 33year old female who presents to our office as a new consult for mastitis and mastodynia  She first noticed a left breast lump in 2015 and underwent an u/s and a left breast biopsy  Pathology revealed a fibroadenoma  The patient is now 14 weeks post partum and noticed the lump was enlarging during her pregnancy  She underwent a repeat left breast u/s on 3/30/2018 which revealed an enlarging left breast mass at the 3:00 position, now measuring 4 8 cm (previously 2 4 cm)  This was still not bothersome to the patient  About one month post partum, the patient was breastfeeding and developed left breast mastitis  She was treated with Keflex x 7 days  She continued breastfeeding and then developed mastitis a second time, this time treated with Augmentin x 7 days  She stopped breastfeeding in mid-May  Again, she developed left breast mastitis, this time prescribed Augmentin x 14 days  She is schedule to recieve her last dose today  Unfortunately, she continues to have a reddened left breast as well as pain which fluctuates  She has been treating this with NSAIDS  Her fever and body aches have dissipated with the initiation of antibiotics  The pain has been so severe that she has presented to the ED twice, most recently 6/2  The patient states that at each visit an u/s was performed and no abscess was identified   She presents today for further treatment recommendations  She reports menarche age 13, 1 pregnancies, 2 live births  Age 26 at age of first child  She has used oral birth control pills  Her grandmother was diagnosed with breast cancer in her [de-identified] and was treated with surgery alone  She is not of Ashkenazi Protestant decent  She is a former smoker  She consumes 2 alcoholic beverages a week  She denies other complaints aside from the left breast pain/redness  Review of Systems:  Review of Systems   Constitutional: Negative for activity change, appetite change, chills, fatigue, fever and unexpected weight change  HENT: Negative for trouble swallowing  Eyes: Negative for pain, redness and visual disturbance  Respiratory: Negative for cough, shortness of breath and wheezing  Cardiovascular: Negative for chest pain, palpitations and leg swelling  Gastrointestinal: Negative for abdominal pain, constipation, diarrhea, nausea and vomiting  Endocrine: Negative for cold intolerance and heat intolerance  Musculoskeletal: Negative for arthralgias, back pain, gait problem and myalgias  Skin: Positive for color change (reddened left breast)  Negative for rash  Neurological: Negative for dizziness, syncope, light-headedness, numbness and headaches  Hematological: Negative for adenopathy  Psychiatric/Behavioral: Negative for agitation and confusion  All other systems reviewed and are negative        Patient Active Problem List   Diagnosis    Dysplasia of cervix, high grade WYATT 2     (spontaneous vaginal delivery)    Mastitis    Anxiety state    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    Type O blood, Rh negative    Depressive disorder    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    Mastodynia, female     Past Medical History:   Diagnosis Date    Anxiety     Depression     Irregular heart beat      Past Surgical History:   Procedure Laterality Date    ASD REPAIR      BREAST SURGERY Bilateral     implants and removal  CARDIAC ELECTROPHYSIOLOGY STUDY AND ABLATION      VA COLPOSCOPY,CERVIX W/ADJ VAG,W/LOOP BX N/A 6/8/2017    Procedure: BIOPSY LEEP CERVIX;  Surgeon: Agustín Elizondo DO;  Location: [facility];  Service: Gynecology     No family history on file  Social History     Social History    Marital status: Single     Spouse name: N/A    Number of children: N/A    Years of education: N/A     Occupational History    Not on file  Social History Main Topics    Smoking status: Former Smoker     Quit date: 5/29/2017    Smokeless tobacco: Never Used    Alcohol use Yes      Comment: occ    Drug use: No    Sexual activity: Not on file     Other Topics Concern    Not on file     Social History Narrative    No narrative on file       Current Outpatient Prescriptions:     acetaminophen (TYLENOL) 325 mg tablet, Take 1 tablet (325 mg total) by mouth every 4 (four) hours as needed for mild pain, Disp: 30 tablet, Rfl: 0    ibuprofen (MOTRIN) 200 mg tablet, Take 200 mg by mouth every 6 (six) hours as needed for mild pain, Disp: , Rfl:     LINOLEIC ACID-SUNFLOWER OIL PO, Take by mouth, Disp: , Rfl:     naproxen (NAPROSYN) 500 mg tablet, Take 1 tablet (500 mg total) by mouth 2 (two) times a day with meals, Disp: 15 tablet, Rfl: 0    oxyCODONE (ROXICODONE) 5 mg immediate release tablet, Take 1 tablet (5 mg total) by mouth every 4 (four) hours as needed for severe pain, Disp: 11 tablet, Rfl: 0    Probiotic Product (PROBIOTIC PO), Take by mouth, Disp: , Rfl:   No Known Allergies  Vitals:    06/04/18 1000   BP: 102/80   Pulse: 87   Resp: 18   Temp: 98 3 °F (36 8 °C)       Physical Exam   Constitutional: She is oriented to person, place, and time  Vital signs are normal  She appears well-developed and well-nourished  No distress  HENT:   Head: Normocephalic and atraumatic  Neck: Normal range of motion  Cardiovascular: Normal rate, regular rhythm and normal heart sounds      Pulmonary/Chest: Effort normal and breath sounds normal    Bilateral breasts were examined in the sitting and supine position  Left breast with erythema, firmness, edema, primarily in the retroareolar region  There are no nipple changes or nipple discharge  Right breast without masses, skin nodules, nipple changes or nipple discharge  There is no bilateral supraclavicular or axillary lymphadenopathy noted  Abdominal: Soft  Normal appearance  She exhibits no mass  There is no hepatosplenomegaly  There is no tenderness  Musculoskeletal: Normal range of motion  Lymphadenopathy:     She has no axillary adenopathy  Right: No supraclavicular adenopathy present  Left: No supraclavicular adenopathy present  Neurological: She is alert and oriented to person, place, and time  Skin: Skin is warm, dry and intact  No rash noted  She is not diaphoretic  Psychiatric: She has a normal mood and affect  Her speech is normal    Vitals reviewed  Advance Care Planning/Advance Directives:  Discussed disease status, treatment goals with the patient

## 2018-06-04 NOTE — PROGRESS NOTES
Breast Ultrasound     Date/Time 6/4/2018 1:38 PM     Performed by  Funmi Aaron by Rabia Bunch      Procedure Details   Procedure Notes: Left breast ultrasound was performed with attention to the area of swelling subareolar as well as the mass in the upper and lower outer quadrants  There was heterogeneous lesions seen subareolar as well as deeper in the breast   The area in the lower outer was likely the prior biopsy proven fibroadenoma  Given her clinical presentation, aspiration was recommended to rule out an abscess  US Guided ASPIRATION LEFT BREAST     Date/Time 6/4/2018 1:40 PM     Performed by  Funmi Aaron by Rabia Bunch       Consent: Verbal consent obtained  Consent given by: patient  Patient understanding: patient states understanding of the procedure being performed      Site preparation: Isopropyl alcohol    Local anesthesia used: yes     Anesthesia   Local anesthesia used: yes  Local Anesthetic: lidocaine 2% without epinephrine  Anesthetic total: 17 mL      Culture: yes   Procedure Details   Procedure Notes: THE LEFT BREAST WAS CLEANED WITH AN ALCOHOL SWAB  UNDER ULTRASOUND GUIDANCE 2% LIDOCAINE PLAIN WAS INJECTED FOR LOCAL ANESTHESIA  ATTENTION WAS TURNED TO THE SUBAREOLAR COLLECTION  THIS WAS ASPIRATED WITH PURULENT MATERIAL NOTED  CULTURE WAS SENT FOR BOTH AEROBIC AND ANAEROBIC MATERIAL  THE SUBAREOLAR AXIS WAS PARTIALLY ASPIRATED  ATTENTION WAS THEN TURNED TO THE HETEROGENEOUS COLLECTION IN THE UPPER OUTER LEFT BREAST  THIS WAS ALSO ASPIRATED UNDER DIRECT ULTRASOUND GUIDANCE WITH COPIOUS PURULENT MATERIAL OBTAINED ROUGHLY 50 CC  THE LOWER OUTER AREA WAS ALSO ASPIRATED WITH VERY LITTLE TO RETURN WHICH IS LIKELY THE REGION OF THE FIBROADENOMA  PATIENT TOLERATED THE PROCEDURE WELL WITHOUT COMPLICATION  TRIPLE ANTIBIOTIC OINTMENT AND A DRY GAUZE WAS PLACED

## 2018-06-06 LAB
BACTERIA SPEC ANAEROBE CULT: NORMAL
BACTERIA WND AEROBE CULT: ABNORMAL
GRAM STN SPEC: ABNORMAL
GRAM STN SPEC: ABNORMAL

## 2018-06-07 ENCOUNTER — OFFICE VISIT (OUTPATIENT)
Dept: SURGICAL ONCOLOGY | Facility: HOSPITAL | Age: 33
End: 2018-06-07
Payer: COMMERCIAL

## 2018-06-07 VITALS
HEART RATE: 71 BPM | TEMPERATURE: 97 F | SYSTOLIC BLOOD PRESSURE: 100 MMHG | DIASTOLIC BLOOD PRESSURE: 70 MMHG | HEIGHT: 68 IN | RESPIRATION RATE: 18 BRPM | WEIGHT: 141 LBS | BODY MASS INDEX: 21.37 KG/M2

## 2018-06-07 DIAGNOSIS — N60.12 CHRONIC MASTITIS OF LEFT BREAST: ICD-10-CM

## 2018-06-07 DIAGNOSIS — D24.2 FIBROADENOMA OF LEFT BREAST: ICD-10-CM

## 2018-06-07 DIAGNOSIS — N61.1 LEFT BREAST ABSCESS: Primary | ICD-10-CM

## 2018-06-07 PROCEDURE — 10060 I&D ABSCESS SIMPLE/SINGLE: CPT | Performed by: SURGERY

## 2018-06-07 PROCEDURE — 99213 OFFICE O/P EST LOW 20 MIN: CPT | Performed by: SURGERY

## 2018-06-07 RX ORDER — NAPROXEN 500 MG/1
500 TABLET ORAL 2 TIMES DAILY WITH MEALS
Qty: 15 TABLET | Refills: 0 | Status: SHIPPED | OUTPATIENT
Start: 2018-06-07 | End: 2018-09-21 | Stop reason: ALTCHOICE

## 2018-06-07 RX ORDER — OXYCODONE HYDROCHLORIDE 5 MG/1
5 TABLET ORAL EVERY 4 HOURS PRN
Qty: 10 TABLET | Refills: 0 | Status: SHIPPED | OUTPATIENT
Start: 2018-06-07 | End: 2018-06-13 | Stop reason: HOSPADM

## 2018-06-07 RX ORDER — CIPROFLOXACIN 500 MG/1
500 TABLET, FILM COATED ORAL EVERY 12 HOURS SCHEDULED
Qty: 20 TABLET | Refills: 0 | Status: SHIPPED | OUTPATIENT
Start: 2018-06-07 | End: 2018-06-17

## 2018-06-07 NOTE — LETTER
June 7, 2018     Jordi Cano DO  [facility]  [address] [facility] [address]    Patient: Rufina Mario   YOB: 1985   Date of Visit: 6/7/2018       Dear Dr Bhumika Raphael: Thank you for referring Kacy Manzanares to me for evaluation  Below are my notes for this consultation  If you have questions, please do not hesitate to call me  I look forward to following your patient along with you  Sincerely,        Hai Soliz MD        CC: DEONDRE Krishnan MD  6/7/2018  4:13 PM  Sign at close encounter     Surgical Oncology Follow Up       [address]  1985  [id number]  [address] [facility]  [address] [address] [address]    Chief Complaint   Patient presents with    Breast Problem     Pt is hrere for follow up        Assessment/Plan   Diagnoses and all orders for this visit:    Left breast abscess  -     ciprofloxacin (CIPRO) 500 mg tablet; Take 1 tablet (500 mg total) by mouth every 12 (twelve) hours for 10 days  -     Incision and Drainage    Fibroadenoma of left breast  -     oxyCODONE (ROXICODONE) 5 mg immediate release tablet; Take 1 tablet (5 mg total) by mouth every 4 (four) hours as needed for severe pain    Chronic mastitis of left breast  -     oxyCODONE (ROXICODONE) 5 mg immediate release tablet; Take 1 tablet (5 mg total) by mouth every 4 (four) hours as needed for severe pain  -     naproxen (NAPROSYN) 500 mg tablet; Take 1 tablet (500 mg total) by mouth 2 (two) times a day with meals        Advance Care Planning/Advance Directives:  Did not discuss  with the patient  Oncology History:     No history exists         History of Present Illness: left breast abscess  -Interval History:none    Review of Systems:  Review of Systems Constitutional: Negative  Negative for appetite change and fever  Eyes: Negative  Respiratory: Negative for shortness of breath  Cardiovascular: Negative  Gastrointestinal: Negative  Endocrine: Negative  Genitourinary: Negative  Musculoskeletal: Negative  Negative for arthralgias and myalgias  Skin: Negative  Allergic/Immunologic: Negative  Neurological: Negative  Hematological: Negative  Negative for adenopathy  Does not bruise/bleed easily  Psychiatric/Behavioral: Negative  Patient Active Problem List   Diagnosis    Dysplasia of cervix, high grade WYATT 2     (spontaneous vaginal delivery)    Mastitis    Anxiety state    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    Type O blood, Rh negative    Depressive disorder    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    Mastodynia, female    Left breast abscess     Past Medical History:   Diagnosis Date    Anxiety     Depression     Irregular heart beat      Past Surgical History:   Procedure Laterality Date    ASD REPAIR      BREAST SURGERY Bilateral     implants and removal    CARDIAC ELECTROPHYSIOLOGY STUDY AND ABLATION      CT COLPOSCOPY,CERVIX W/ADJ VAG,W/LOOP BX N/A 2017    Procedure: BIOPSY LEEP CERVIX;  Surgeon: Jose Manuel Evans DO;  Location: [facility] OR;  Service: Gynecology     No family history on file  Social History     Social History    Marital status: Single     Spouse name: N/A    Number of children: N/A    Years of education: N/A     Occupational History    Not on file       Social History Main Topics    Smoking status: Former Smoker     Quit date: 2017    Smokeless tobacco: Never Used    Alcohol use Yes      Comment: occ    Drug use: No    Sexual activity: Not on file     Other Topics Concern    Not on file     Social History Narrative    No narrative on file       Current Outpatient Prescriptions:     acetaminophen (TYLENOL) 325 mg tablet, Take 1 tablet (325 mg total) by mouth every 4 (four) hours as needed for mild pain, Disp: 30 tablet, Rfl: 0    amoxicillin-clavulanate (AUGMENTIN) 875-125 mg per tablet, Take 1 tablet by mouth every 12 (twelve) hours for 7 days, Disp: 14 tablet, Rfl: 0    ibuprofen (MOTRIN) 200 mg tablet, Take 200 mg by mouth every 6 (six) hours as needed for mild pain, Disp: , Rfl:     naproxen (NAPROSYN) 500 mg tablet, Take 1 tablet (500 mg total) by mouth 2 (two) times a day with meals, Disp: 15 tablet, Rfl: 0    oxyCODONE (ROXICODONE) 5 mg immediate release tablet, Take 1 tablet (5 mg total) by mouth every 4 (four) hours as needed for severe pain, Disp: 11 tablet, Rfl: 0    Probiotic Product (PROBIOTIC PO), Take by mouth, Disp: , Rfl:     ciprofloxacin (CIPRO) 500 mg tablet, Take 1 tablet (500 mg total) by mouth every 12 (twelve) hours for 10 days, Disp: 20 tablet, Rfl: 0    LINOLEIC ACID-SUNFLOWER OIL PO, Take by mouth, Disp: , Rfl:   Allergies   Allergen Reactions    Other Other (See Comments)     contrast dye    Adhesive [Medical Tape] Rash       The following portions of the patient's history were reviewed and updated as appropriate: allergies, current medications, past family history, past medical history, past social history, past surgical history and problem list         Vitals:    06/07/18 1455   BP: 100/70   Pulse: 71   Resp: 18   Temp: (!) 97 °F (36 1 °C)       Physical Exam   Constitutional: She appears well-developed and well-nourished  Pulmonary/Chest: Left breast exhibits skin change and tenderness (residual central mild erythema)  Left breast exhibits no inverted nipple, no mass and no nipple discharge     Swelling at nipple and lateral breast         Results:  Labs:  6/4/18 wound culture-Kebsiella    Imaging  Ultrasound done again today in the office shows a residual fluid collection upper outer left breast as well as subareolar    Ultrasound-guided aspiration was performed today in the office after instillation 2 percent lidocaine plain, 10 cc  There was residual purulent fluid aspirated from the fluid pocket noted in the upper portion of the left breast   An additional 5 cc was injected inferior and subareolar with attempted aspiration; however there was minimal fluid obtained from the subareolar pocket  Given the lack of result on the needle-guided aspiration of the subareolar pocket and her residual symptoms and findings on exam, I recommended doing an incision and drainage procedure  Incision and drain  Date/Time: 6/7/2018 4:05 PM  Performed by: Dennis Smiley  Authorized by: Dennis Smiley     Consent:     Consent obtained:  Verbal    Consent given by:  Patient  Location:     Type:  Abscess    Location: left breast subareolar  Pre-procedure details:     Skin preparation:  Betadine  Anesthesia (see MAR for exact dosages): Anesthesia method:  Local infiltration    Local anesthetic:  Lidocaine 2% w/o epi  Procedure details:     Complexity:  Simple    Incision types:  Stab incision    Scalpel blade:  11    Approach:  Open    Incision depth:  Subcutaneous    Wound management:  Probed and deloculated    Drainage:  Purulent    Drainage amount: Moderate    Wound treatment:  Wound left open    Packing materials:  1/4 in gauze  Post-procedure details:     Patient tolerance of procedure: Tolerated well, no immediate complications      Discussion/Summary:  52-year-old female here today for a follow-up visit secondary to left breast abscess  This was aspirated percutaneously in the office earlier this week with copious amount of pus that was obtained  Culture was sent revealing Klebsiella  This is sensitive to several antibiotics including Cipro  I will switch her to ciprofloxacin from the Augmentin  She had a residual fluid collection noted on office ultrasound today  This was again aspirated  However, the subareolar collection was unable to be aspirated percutaneously    I therefore recommended incision and drainage of this area  This was also performed today in the office with a moderate amount of purulent material obtained  The wound was packed with quarter-inch gauze and covered with a dry gauze dressing  She was given wound care instructions for home  She will follow up again next week with our office

## 2018-06-07 NOTE — LETTER
2018     Merlin Lyn DO  [facility]  [address] [facility]  [address]    Patient: Odalis Altamirano   YOB: 1985   Date of Visit: 2018       Dear Dr Bony Swain: Thank you for referring King Palacio to me for evaluation  Below are my notes for this consultation  If you have questions, please do not hesitate to call me  I look forward to following your patient along with you  Sincerely,        Virginia Mckenna MD        CC: DEONDRE Davidson MD  2018  4:11 PM  Sign at close encounter     Surgical Oncology Follow Up       [address]  1985  [id number]  [address] [place] [id number] [facility] [address] [address]    Chief Complaint   Patient presents with    Breast Problem     Pt is hrere for follow up        Assessment/Plan   {Assess/Plan SmartLinks:80838}    Advance Care Planning/Advance Directives:  {MDA CLN ACP/AD:61335}    Oncology History:     No history exists  History of Present Illness: ***  -Interval History:***    Review of Systems:  Review of Systems   Constitutional: Negative  Negative for appetite change and fever  Eyes: Negative  Respiratory: Negative for shortness of breath  Cardiovascular: Negative  Gastrointestinal: Negative  Endocrine: Negative  Genitourinary: Negative  Musculoskeletal: Negative  Negative for arthralgias and myalgias  Skin: Negative  Allergic/Immunologic: Negative  Neurological: Negative  Hematological: Negative  Negative for adenopathy  Does not bruise/bleed easily  Psychiatric/Behavioral: Negative          Patient Active Problem List   Diagnosis    Dysplasia of cervix, high grade WYATT 2     (spontaneous vaginal delivery)    Mastitis    Anxiety state    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    Type O blood, Rh negative    Depressive disorder    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    Mastodynia, female    Left breast abscess     Past Medical History:   Diagnosis Date    Anxiety     Depression     Irregular heart beat      Past Surgical History:   Procedure Laterality Date    ASD REPAIR      BREAST SURGERY Bilateral     implants and removal    CARDIAC ELECTROPHYSIOLOGY STUDY AND ABLATION      MT COLPOSCOPY,CERVIX W/ADJ VAG,W/LOOP BX N/A 6/8/2017    Procedure: BIOPSY LEEP CERVIX;  Surgeon: Sil Díaz DO;  Location: [facility];  Service: Gynecology     No family history on file  Social History     Social History    Marital status: Single     Spouse name: N/A    Number of children: N/A    Years of education: N/A     Occupational History    Not on file       Social History Main Topics    Smoking status: Former Smoker     Quit date: 5/29/2017    Smokeless tobacco: Never Used    Alcohol use Yes      Comment: occ    Drug use: No    Sexual activity: Not on file     Other Topics Concern    Not on file     Social History Narrative    No narrative on file       Current Outpatient Prescriptions:     acetaminophen (TYLENOL) 325 mg tablet, Take 1 tablet (325 mg total) by mouth every 4 (four) hours as needed for mild pain, Disp: 30 tablet, Rfl: 0    amoxicillin-clavulanate (AUGMENTIN) 875-125 mg per tablet, Take 1 tablet by mouth every 12 (twelve) hours for 7 days, Disp: 14 tablet, Rfl: 0    ibuprofen (MOTRIN) 200 mg tablet, Take 200 mg by mouth every 6 (six) hours as needed for mild pain, Disp: , Rfl:     naproxen (NAPROSYN) 500 mg tablet, Take 1 tablet (500 mg total) by mouth 2 (two) times a day with meals, Disp: 15 tablet, Rfl: 0    oxyCODONE (ROXICODONE) 5 mg immediate release tablet, Take 1 tablet (5 mg total) by mouth every 4 (four) hours as needed for severe pain, Disp: 11 tablet, Rfl: 0    Probiotic Product (PROBIOTIC PO), Take by mouth, Disp: , Rfl:     ciprofloxacin (CIPRO) 500 mg tablet, Take 1 tablet (500 mg total) by mouth every 12 (twelve) hours for 10 days, Disp: 20 tablet, Rfl: 0    LINOLEIC ACID-SUNFLOWER OIL PO, Take by mouth, Disp: , Rfl:   Allergies   Allergen Reactions    Other Other (See Comments)     contrast dye    Adhesive [Medical Tape] Rash       {Common H&P Review SmartLinks:95809}        Vitals:    06/07/18 1455   BP: 100/70   Pulse: 71   Resp: 18   Temp: (!) 97 °F (36 1 °C)       Physical Exam   Constitutional: She appears well-developed and well-nourished  Pulmonary/Chest: Left breast exhibits skin change and tenderness (residual central mild erythema)  Left breast exhibits no inverted nipple, no mass and no nipple discharge  Swelling at nipple and lateral breast         Results:  Labs:  6/4/18 wound culture-Kebsiella    Imaging  Ultrasound done again today in the office shows a residual fluid collection upper outer left breast as well as subareolar    Ultrasound-guided aspiration was performed today in the office after instillation 2 percent lidocaine plain, 10 cc  There was residual purulent fluid aspirated from the fluid pocket noted in the upper portion of the left breast   An additional 5 cc was injected inferior and subareolar with attempted aspiration; however there was minimal fluid obtained from the subareolar pocket  Given the lack of result on the needle-guided aspiration of the subareolar pocket and her residual symptoms and findings on exam, I recommended doing an incision and drainage procedure  Incision and drain  Date/Time: 6/7/2018 4:05 PM  Performed by: Rae Michel  Authorized by: Rae Michel     Consent:     Consent obtained:  Verbal    Consent given by:  Patient  Location:     Type:  Abscess    Location: left breast subareolar  Pre-procedure details:     Skin preparation:  Betadine  Anesthesia (see MAR for exact dosages):      Anesthesia method:  Local infiltration    Local anesthetic:  Lidocaine 2% w/o epi  Procedure details:     Complexity:  Simple    Incision types:  Stab incision    Scalpel blade:  11    Approach:  Open    Incision depth:  Subcutaneous    Wound management:  Probed and deloculated    Drainage:  Purulent    Drainage amount: Moderate    Wound treatment:  Wound left open    Packing materials:  1/4 in gauze  Post-procedure details:     Patient tolerance of procedure: Tolerated well, no immediate complications      Discussion/Summary:  59-year-old female here today for a follow-up visit secondary to left breast abscess  This was aspirated percutaneously in the office earlier this week with copious amount of pus that was obtained  Culture was sent revealing Klebsiella  This is sensitive to several antibiotics including Cipro  I will switch her to ciprofloxacin from the Augmentin  She had a residual fluid collection noted on office ultrasound today  This was again aspirated  However, the subareolar collection was unable to be aspirated percutaneously  I therefore recommended incision and drainage of this area  This was also performed today in the office with a moderate amount of purulent material obtained  The wound was packed with quarter-inch gauze and covered with a dry gauze dressing  She was given wound care instructions for home  She will follow up again next week with our office

## 2018-06-07 NOTE — PROGRESS NOTES
Surgical Oncology Follow Up       [facility] [address]  1985  [id number]  [address]  [facility] [place]  [facility]  [id number] [place] [address] Drive [address]    Chief Complaint   Patient presents with    Breast Problem     Pt is hrere for follow up        Assessment/Plan   Diagnoses and all orders for this visit:    Left breast abscess  -     ciprofloxacin (CIPRO) 500 mg tablet; Take 1 tablet (500 mg total) by mouth every 12 (twelve) hours for 10 days  -     Incision and Drainage    Fibroadenoma of left breast  -     oxyCODONE (ROXICODONE) 5 mg immediate release tablet; Take 1 tablet (5 mg total) by mouth every 4 (four) hours as needed for severe pain    Chronic mastitis of left breast  -     oxyCODONE (ROXICODONE) 5 mg immediate release tablet; Take 1 tablet (5 mg total) by mouth every 4 (four) hours as needed for severe pain  -     naproxen (NAPROSYN) 500 mg tablet; Take 1 tablet (500 mg total) by mouth 2 (two) times a day with meals        Advance Care Planning/Advance Directives:  Did not discuss  with the patient  Oncology History:     No history exists  History of Present Illness: left breast abscess  -Interval History:none    Review of Systems:  Review of Systems   Constitutional: Negative  Negative for appetite change and fever  Eyes: Negative  Respiratory: Negative for shortness of breath  Cardiovascular: Negative  Gastrointestinal: Negative  Endocrine: Negative  Genitourinary: Negative  Musculoskeletal: Negative  Negative for arthralgias and myalgias  Skin: Negative  Allergic/Immunologic: Negative  Neurological: Negative  Hematological: Negative  Negative for adenopathy  Does not bruise/bleed easily  Psychiatric/Behavioral: Negative          Patient Active Problem List   Diagnosis    Dysplasia of cervix, high grade WYATT 2     (spontaneous vaginal delivery)    Mastitis    Anxiety state    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    Type O blood, Rh negative    Depressive disorder    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    Mastodynia, female    Left breast abscess     Past Medical History:   Diagnosis Date    Anxiety     Depression     Irregular heart beat      Past Surgical History:   Procedure Laterality Date    ASD REPAIR      BREAST SURGERY Bilateral     implants and removal    CARDIAC ELECTROPHYSIOLOGY STUDY AND ABLATION      ME COLPOSCOPY,CERVIX W/ADJ VAG,W/LOOP BX N/A 2017    Procedure: BIOPSY LEEP CERVIX;  Surgeon: Lenora Ruth DO;  Location: [facility];  Service: Gynecology     No family history on file  Social History     Social History    Marital status: Single     Spouse name: N/A    Number of children: N/A    Years of education: N/A     Occupational History    Not on file       Social History Main Topics    Smoking status: Former Smoker     Quit date: 2017    Smokeless tobacco: Never Used    Alcohol use Yes      Comment: occ    Drug use: No    Sexual activity: Not on file     Other Topics Concern    Not on file     Social History Narrative    No narrative on file       Current Outpatient Prescriptions:     acetaminophen (TYLENOL) 325 mg tablet, Take 1 tablet (325 mg total) by mouth every 4 (four) hours as needed for mild pain, Disp: 30 tablet, Rfl: 0    amoxicillin-clavulanate (AUGMENTIN) 875-125 mg per tablet, Take 1 tablet by mouth every 12 (twelve) hours for 7 days, Disp: 14 tablet, Rfl: 0    ibuprofen (MOTRIN) 200 mg tablet, Take 200 mg by mouth every 6 (six) hours as needed for mild pain, Disp: , Rfl:     naproxen (NAPROSYN) 500 mg tablet, Take 1 tablet (500 mg total) by mouth 2 (two) times a day with meals, Disp: 15 tablet, Rfl: 0    oxyCODONE (ROXICODONE) 5 mg immediate release tablet, Take 1 tablet (5 mg total) by mouth every 4 (four) hours as needed for severe pain, Disp: 11 tablet, Rfl: 0    Probiotic Product (PROBIOTIC PO), Take by mouth, Disp: , Rfl:     ciprofloxacin (CIPRO) 500 mg tablet, Take 1 tablet (500 mg total) by mouth every 12 (twelve) hours for 10 days, Disp: 20 tablet, Rfl: 0    LINOLEIC ACID-SUNFLOWER OIL PO, Take by mouth, Disp: , Rfl:   Allergies   Allergen Reactions    Other Other (See Comments)     contrast dye    Adhesive [Medical Tape] Rash       The following portions of the patient's history were reviewed and updated as appropriate: allergies, current medications, past family history, past medical history, past social history, past surgical history and problem list         Vitals:    06/07/18 1455   BP: 100/70   Pulse: 71   Resp: 18   Temp: (!) 97 °F (36 1 °C)       Physical Exam   Constitutional: She appears well-developed and well-nourished  Pulmonary/Chest: Left breast exhibits skin change and tenderness (residual central mild erythema)  Left breast exhibits no inverted nipple, no mass and no nipple discharge  Swelling at nipple and lateral breast         Results:  Labs:  6/4/18 wound culture-Kebsiella    Imaging  Ultrasound done again today in the office shows a residual fluid collection upper outer left breast as well as subareolar    Ultrasound-guided aspiration was performed today in the office after instillation 2 percent lidocaine plain, 10 cc  There was residual purulent fluid aspirated from the fluid pocket noted in the upper portion of the left breast   An additional 5 cc was injected inferior and subareolar with attempted aspiration; however there was minimal fluid obtained from the subareolar pocket  Given the lack of result on the needle-guided aspiration of the subareolar pocket and her residual symptoms and findings on exam, I recommended doing an incision and drainage procedure      Incision and drain  Date/Time: 6/7/2018 4:05 PM  Performed by: Sophia Meneseskeeper  Authorized by: Sophia Reyes     Consent:     Consent obtained:  Verbal    Consent given by:  Patient  Location:     Type:  Abscess    Location: left breast subareolar  Pre-procedure details:     Skin preparation:  Betadine  Anesthesia (see MAR for exact dosages): Anesthesia method:  Local infiltration    Local anesthetic:  Lidocaine 2% w/o epi  Procedure details:     Complexity:  Simple    Incision types:  Stab incision    Scalpel blade:  11    Approach:  Open    Incision depth:  Subcutaneous    Wound management:  Probed and deloculated    Drainage:  Purulent    Drainage amount: Moderate    Wound treatment:  Wound left open    Packing materials:  1/4 in gauze  Post-procedure details:     Patient tolerance of procedure: Tolerated well, no immediate complications      Discussion/Summary:  21-year-old female here today for a follow-up visit secondary to left breast abscess  This was aspirated percutaneously in the office earlier this week with copious amount of pus that was obtained  Culture was sent revealing Klebsiella  This is sensitive to several antibiotics including Cipro  I will switch her to ciprofloxacin from the Augmentin  She had a residual fluid collection noted on office ultrasound today  This was again aspirated  However, the subareolar collection was unable to be aspirated percutaneously  I therefore recommended incision and drainage of this area  This was also performed today in the office with a moderate amount of purulent material obtained  The wound was packed with quarter-inch gauze and covered with a dry gauze dressing  She was given wound care instructions for home  She will follow up again next week with our office

## 2018-06-13 ENCOUNTER — OFFICE VISIT (OUTPATIENT)
Dept: SURGICAL ONCOLOGY | Facility: CLINIC | Age: 33
End: 2018-06-13
Payer: COMMERCIAL

## 2018-06-13 VITALS
WEIGHT: 142 LBS | HEART RATE: 68 BPM | RESPIRATION RATE: 14 BRPM | BODY MASS INDEX: 21.52 KG/M2 | HEIGHT: 68 IN | TEMPERATURE: 98.9 F | SYSTOLIC BLOOD PRESSURE: 110 MMHG | DIASTOLIC BLOOD PRESSURE: 66 MMHG

## 2018-06-13 DIAGNOSIS — N61.1 LEFT BREAST ABSCESS: Primary | ICD-10-CM

## 2018-06-13 PROCEDURE — 99212 OFFICE O/P EST SF 10 MIN: CPT | Performed by: NURSE PRACTITIONER

## 2018-06-13 NOTE — PROGRESS NOTES
Surgical Oncology Follow Up       [address] [place] [address],[address] [facility]  [facility] [address] [facility]  1985  [id number]  [address] [place] [address],[address] [place] [address]  [facility] SURGICAL ONCOLOGY [address] [address]    Chief Complaint   Patient presents with    Post-op     I&D breast        Assessment/Plan:  1  Left breast abscess  - Complete course of Cipro  -Call with redness, pain, fevers/chills      Discussion/Summary: Patient is a 33year old female who presents today for a f/u visit for a left breast abscess  She was seen by our office on 6/4 and the left breast abscess was aspirated by Dr Ashley Horn and cultures were sent, revealing Klebsiella  She was started on Ciprofloxacin, today is day 6 of 10  She was seen again by Dr Ashley Horn on 6/7/2018 and an incision and drainage was performed in the office  Packing was placed which was d/c'ed by the patient 24 hours later  She states that as of yesterday, her wound has completely healed  She has improved clinically  Erythema has resolved  Patient denies fevers, chills  She does report that she still has pain, but this is resolved with Ibuprofen  Educated her that this is expected, but she should call if pain worsens  U/s performed in office by Dr Fahad Montano which did not reveal remaining abscess  I have encouraged the patient to complete the course of antibiotics and to call us symptoms recur  The patient wishes to have her fibroadenoma removed in the future  I've educated the patient that her infection needs to be cleared prior to elective surgery  I will have the patient return to see Dr Ashley Horn in 3 mo to discuss excision of fibroadenoma  I've instructed the patient to call with any new concerns or symptoms  All of her questions were answered  History of Present Illness:      -Interval History:  Patient presents today for a follow-up visit for a left breast abscess    She underwent an I and D by Dr Yue Jones on 2018  She is on day 6 of 10 of ciprofloxacin  Denies erythema, fevers, chills  She does report that she still has pain, but this is resolved with Ibuprofen  Review of Systems:  Review of Systems   Constitutional: Negative for activity change, appetite change, chills, fatigue, fever and unexpected weight change  HENT: Negative for trouble swallowing  Eyes: Negative for pain, redness and visual disturbance  Respiratory: Negative for cough, shortness of breath and wheezing  Cardiovascular: Negative for chest pain, palpitations and leg swelling  Gastrointestinal: Negative for abdominal pain, constipation, diarrhea, nausea and vomiting  Endocrine: Negative for cold intolerance and heat intolerance  Musculoskeletal: Negative for arthralgias, back pain, gait problem and myalgias  Skin: Negative for color change and rash  Neurological: Negative for dizziness, syncope, light-headedness, numbness and headaches  Hematological: Negative for adenopathy  Psychiatric/Behavioral: Negative for agitation and confusion  All other systems reviewed and are negative        Patient Active Problem List   Diagnosis    Dysplasia of cervix, high grade WYATT 2     (spontaneous vaginal delivery)    Mastitis    Anxiety state    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    Type O blood, Rh negative    Depressive disorder    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    Mastodynia, female    Left breast abscess     Past Medical History:   Diagnosis Date    Anxiety     Depression     Irregular heart beat      Past Surgical History:   Procedure Laterality Date    ASD REPAIR      BREAST SURGERY Bilateral     implants and removal    CARDIAC ELECTROPHYSIOLOGY STUDY AND ABLATION      NE COLPOSCOPY,CERVIX W/ADJ VAG,W/LOOP BX N/A 2017    Procedure: BIOPSY LEEP CERVIX;  Surgeon: Alee Haile DO;  Location: [facility];  Service: Gynecology History reviewed  No pertinent family history  Social History     Social History    Marital status: Single     Spouse name: N/A    Number of children: N/A    Years of education: N/A     Occupational History    Not on file  Social History Main Topics    Smoking status: Former Smoker     Quit date: 5/29/2017    Smokeless tobacco: Never Used    Alcohol use Yes      Comment: occ    Drug use: No    Sexual activity: Not on file     Other Topics Concern    Not on file     Social History Narrative    No narrative on file       Current Outpatient Prescriptions:     acetaminophen (TYLENOL) 325 mg tablet, Take 1 tablet (325 mg total) by mouth every 4 (four) hours as needed for mild pain, Disp: 30 tablet, Rfl: 0    ciprofloxacin (CIPRO) 500 mg tablet, Take 1 tablet (500 mg total) by mouth every 12 (twelve) hours for 10 days, Disp: 20 tablet, Rfl: 0    ibuprofen (MOTRIN) 200 mg tablet, Take 200 mg by mouth every 6 (six) hours as needed for mild pain, Disp: , Rfl:     naproxen (NAPROSYN) 500 mg tablet, Take 1 tablet (500 mg total) by mouth 2 (two) times a day with meals, Disp: 15 tablet, Rfl: 0    Probiotic Product (PROBIOTIC PO), Take by mouth, Disp: , Rfl:   Allergies   Allergen Reactions    Adhesive [Medical Tape] Rash    Other Other (See Comments)     contrast dye     Vitals:    06/13/18 1424   BP: 110/66   Pulse: 68   Resp: 14   Temp: 98 9 °F (37 2 °C)       Physical Exam   Constitutional: She is oriented to person, place, and time  She appears well-developed and well-nourished  No distress  HENT:   Head: Normocephalic  Neck: Normal range of motion  Pulmonary/Chest: Effort normal    Left breast without erythema  Well healed incision  Lump noted at 3:00 position, consistent with fibroadenoma  Mild areolar and later breast edema  Musculoskeletal: Normal range of motion  Neurological: She is alert and oriented to person, place, and time  Skin: Skin is warm and dry  No rash noted   No erythema  Vitals reviewed  Advance Care Planning/Advance Directives:  Discussed disease status, treatment goals with the patient

## 2018-09-17 ENCOUNTER — OFFICE VISIT (OUTPATIENT)
Dept: SURGICAL ONCOLOGY | Facility: CLINIC | Age: 33
End: 2018-09-17
Payer: COMMERCIAL

## 2018-09-17 VITALS
BODY MASS INDEX: 20.76 KG/M2 | RESPIRATION RATE: 16 BRPM | DIASTOLIC BLOOD PRESSURE: 70 MMHG | HEART RATE: 66 BPM | SYSTOLIC BLOOD PRESSURE: 100 MMHG | HEIGHT: 68 IN | WEIGHT: 137 LBS | TEMPERATURE: 97.9 F

## 2018-09-17 DIAGNOSIS — D24.2 FIBROADENOMA OF BREAST, LEFT: Primary | ICD-10-CM

## 2018-09-17 PROBLEM — N61.1 LEFT BREAST ABSCESS: Status: RESOLVED | Noted: 2018-06-04 | Resolved: 2018-09-17

## 2018-09-17 PROCEDURE — 99214 OFFICE O/P EST MOD 30 MIN: CPT | Performed by: SURGERY

## 2018-09-17 RX ORDER — TRAMADOL HYDROCHLORIDE 50 MG/1
50 TABLET ORAL EVERY 6 HOURS PRN
Qty: 20 TABLET | Refills: 0 | Status: SHIPPED | OUTPATIENT
Start: 2018-09-17 | End: 2021-10-11

## 2018-09-17 NOTE — PROGRESS NOTES
Surgical Oncology Follow Up       [facility]  [facility] [address] [address]    Albaro PENG [facility]  1985  [id number]  [address]  [facility]COL[facility] [place]  [address] [place] [id number]    Chief Complaint   Patient presents with    Breast Problem     Pt is here for follow up        Assessment/Plan   Diagnoses and all orders for this visit:    Fibroadenoma of breast, left  -     traMADol (ULTRAM) 50 mg tablet; Take 1 tablet (50 mg total) by mouth every 6 (six) hours as needed for moderate pain    Other orders  -     ceFAZolin (ANCEF) IVPB (premix) 1,000 mg; Infuse 50 mL (1,000 mg total) into a venous catheter once         Advance Care Planning/Advance Directives:  Did not discuss  with the patient  Oncology History:     No history exists  History of Present Illness: follow up  -Interval History: occasional right sided breast pain likely secondary to scar tissue     Review of Systems:  Review of Systems   Constitutional: Negative  Negative for appetite change and fever  Eyes: Negative  Respiratory: Negative for shortness of breath  Cardiovascular: Negative  Gastrointestinal: Negative  Endocrine: Negative  Genitourinary: Negative  Musculoskeletal: Negative  Negative for arthralgias and myalgias  Skin: Negative  Allergic/Immunologic: Negative  Neurological: Negative  Hematological: Negative  Negative for adenopathy  Does not bruise/bleed easily  Psychiatric/Behavioral: Negative          Patient Active Problem List   Diagnosis    Dysplasia of cervix, high grade WYATT 2     (spontaneous vaginal delivery)    Anxiety state    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    Type O blood, 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    Depressive disorder    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    Mastodynia, female    Fibroadenoma of breast, left     Past Medical History:   Diagnosis Date    Anxiety     Depression     Irregular heart beat      Past Surgical History:   Procedure Laterality Date    ASD REPAIR      BREAST SURGERY Bilateral     implants and removal    CARDIAC ELECTROPHYSIOLOGY STUDY AND ABLATION      SC COLPOSCOPY,CERVIX W/ADJ VAG,W/LOOP BX N/A 6/8/2017    Procedure: BIOPSY LEEP CERVIX;  Surgeon: Everton Pagan DO;  Location: [facility] OR;  Service: Gynecology     No family history on file  Social History     Social History    Marital status: Single     Spouse name: N/A    Number of children: N/A    Years of education: N/A     Occupational History    Not on file       Social History Main Topics    Smoking status: Former Smoker     Quit date: 5/29/2017    Smokeless tobacco: Never Used    Alcohol use Yes      Comment: occ    Drug use: No    Sexual activity: Not on file     Other Topics Concern    Not on file     Social History Narrative    No narrative on file       Current Outpatient Prescriptions:     Probiotic Product (PROBIOTIC PO), Take by mouth, Disp: , Rfl:     acetaminophen (TYLENOL) 325 mg tablet, Take 1 tablet (325 mg total) by mouth every 4 (four) hours as needed for mild pain (Patient not taking: Reported on 9/17/2018 ), Disp: 30 tablet, Rfl: 0    ibuprofen (MOTRIN) 200 mg tablet, Take 200 mg by mouth every 6 (six) hours as needed for mild pain, Disp: , Rfl:     naproxen (NAPROSYN) 500 mg tablet, Take 1 tablet (500 mg total) by mouth 2 (two) times a day with meals (Patient not taking: Reported on 9/17/2018 ), Disp: 15 tablet, Rfl: 0    traMADol (ULTRAM) 50 mg tablet, Take 1 tablet (50 mg total) by mouth every 6 (six) hours as needed for moderate pain, Disp: 20 tablet, Rfl: 0  Allergies   Allergen Reactions    Adhesive [Medical Tape] Rash    Other Other (See Comments)     contrast dye  contrast dye       The following portions of the patient's history were reviewed and updated as appropriate: allergies, current medications, past family history, past medical history, past social history, past surgical history and problem list         Vitals:    09/17/18 1529   BP: 100/70   Pulse: 66   Resp: 16   Temp: 97 9 °F (36 6 °C)       Physical Exam   Constitutional: She is oriented to person, place, and time  She appears well-developed and well-nourished  HENT:   Head: Normocephalic and atraumatic  Cardiovascular: Normal heart sounds  Pulmonary/Chest: Breath sounds normal  Right breast exhibits no inverted nipple, no mass, no nipple discharge, no skin change and no tenderness  Left breast exhibits mass  Left breast exhibits no inverted nipple, no nipple discharge, no skin change and no tenderness  Abdominal: Soft  Lymphadenopathy:        Right axillary: No pectoral and no lateral adenopathy present  Left axillary: No pectoral and no lateral adenopathy present  Right: No supraclavicular adenopathy present  Left: No supraclavicular adenopathy present  Neurological: She is alert and oriented to person, place, and time  Psychiatric: She has a normal mood and affect  Discussion/Summary:  80-year-old female here today for a follow-up visit secondary to a large fibroadenoma of the left breast   This had doubled approximately in size  She had wanted surgical excision; however, she developed a severe infection and abscess of the left breast while nursing  This has since resolved  She continues to desire surgical excision  I discussed this procedure with her and answered all of her questions  She also reports occasional pain on the right side that is likely secondary to prior augmentation and removal  I made supportive recommendations for this as well  Consent was signed today in the office and she will be scheduled for excisional biopsy of the left breast in the near term

## 2018-09-21 ENCOUNTER — ANESTHESIA EVENT (OUTPATIENT)
Dept: PERIOP | Facility: HOSPITAL | Age: 33
End: 2018-09-21
Payer: COMMERCIAL

## 2018-09-21 ENCOUNTER — APPOINTMENT (OUTPATIENT)
Dept: PREADMISSION TESTING | Facility: HOSPITAL | Age: 33
End: 2018-09-21
Payer: COMMERCIAL

## 2018-09-21 ENCOUNTER — APPOINTMENT (OUTPATIENT)
Dept: LAB | Facility: HOSPITAL | Age: 33
End: 2018-09-21
Attending: SURGERY
Payer: COMMERCIAL

## 2018-09-21 DIAGNOSIS — D24.2 FIBROADENOMA OF BREAST, LEFT: ICD-10-CM

## 2018-09-21 LAB
ALBUMIN SERPL BCP-MCNC: 4 G/DL (ref 3.5–5)
ALP SERPL-CCNC: 49 U/L (ref 46–116)
ALT SERPL W P-5'-P-CCNC: 17 U/L (ref 12–78)
ANION GAP SERPL CALCULATED.3IONS-SCNC: 7 MMOL/L (ref 4–13)
APTT PPP: 32 SECONDS (ref 24–36)
AST SERPL W P-5'-P-CCNC: 16 U/L (ref 5–45)
BASOPHILS # BLD AUTO: 0.06 THOUSANDS/ΜL (ref 0–0.1)
BASOPHILS NFR BLD AUTO: 1 % (ref 0–1)
BILIRUB SERPL-MCNC: 0.34 MG/DL (ref 0.2–1)
BUN SERPL-MCNC: 10 MG/DL (ref 5–25)
CALCIUM SERPL-MCNC: 9.1 MG/DL (ref 8.3–10.1)
CHLORIDE SERPL-SCNC: 107 MMOL/L (ref 100–108)
CO2 SERPL-SCNC: 28 MMOL/L (ref 21–32)
CREAT SERPL-MCNC: 0.65 MG/DL (ref 0.6–1.3)
EOSINOPHIL # BLD AUTO: 0.06 THOUSAND/ΜL (ref 0–0.61)
EOSINOPHIL NFR BLD AUTO: 1 % (ref 0–6)
ERYTHROCYTE [DISTWIDTH] IN BLOOD BY AUTOMATED COUNT: 12.2 % (ref 11.6–15.1)
GFR SERPL CREATININE-BSD FRML MDRD: 119 ML/MIN/1.73SQ M
GLUCOSE P FAST SERPL-MCNC: 76 MG/DL (ref 65–99)
HCT VFR BLD AUTO: 42.1 % (ref 34.8–46.1)
HGB BLD-MCNC: 13.7 G/DL (ref 11.5–15.4)
IMM GRANULOCYTES # BLD AUTO: 0.01 THOUSAND/UL (ref 0–0.2)
IMM GRANULOCYTES NFR BLD AUTO: 0 % (ref 0–2)
INR PPP: 1.04 (ref 0.86–1.17)
LYMPHOCYTES # BLD AUTO: 1.9 THOUSANDS/ΜL (ref 0.6–4.47)
LYMPHOCYTES NFR BLD AUTO: 27 % (ref 14–44)
MCH RBC QN AUTO: 30.9 PG (ref 26.8–34.3)
MCHC RBC AUTO-ENTMCNC: 32.5 G/DL (ref 31.4–37.4)
MCV RBC AUTO: 95 FL (ref 82–98)
MONOCYTES # BLD AUTO: 0.47 THOUSAND/ΜL (ref 0.17–1.22)
MONOCYTES NFR BLD AUTO: 7 % (ref 4–12)
NEUTROPHILS # BLD AUTO: 4.54 THOUSANDS/ΜL (ref 1.85–7.62)
NEUTS SEG NFR BLD AUTO: 64 % (ref 43–75)
NRBC BLD AUTO-RTO: 0 /100 WBCS
PLATELET # BLD AUTO: 273 THOUSANDS/UL (ref 149–390)
PMV BLD AUTO: 11 FL (ref 8.9–12.7)
POTASSIUM SERPL-SCNC: 4 MMOL/L (ref 3.5–5.3)
PROT SERPL-MCNC: 7.9 G/DL (ref 6.4–8.2)
PROTHROMBIN TIME: 13.7 SECONDS (ref 11.8–14.2)
RBC # BLD AUTO: 4.44 MILLION/UL (ref 3.81–5.12)
SODIUM SERPL-SCNC: 142 MMOL/L (ref 136–145)
WBC # BLD AUTO: 7.04 THOUSAND/UL (ref 4.31–10.16)

## 2018-09-21 PROCEDURE — 36415 COLL VENOUS BLD VENIPUNCTURE: CPT

## 2018-09-21 PROCEDURE — 85730 THROMBOPLASTIN TIME PARTIAL: CPT

## 2018-09-21 PROCEDURE — 80053 COMPREHEN METABOLIC PANEL: CPT

## 2018-09-21 PROCEDURE — 85025 COMPLETE CBC W/AUTO DIFF WBC: CPT

## 2018-09-21 PROCEDURE — 85610 PROTHROMBIN TIME: CPT

## 2018-09-21 RX ORDER — NAPROXEN 500 MG/1
250 TABLET ORAL 2 TIMES DAILY WITH MEALS
COMMUNITY
End: 2021-10-11

## 2018-09-21 RX ORDER — SODIUM CHLORIDE 9 MG/ML
125 INJECTION, SOLUTION INTRAVENOUS CONTINUOUS
Status: CANCELLED | OUTPATIENT
Start: 2018-09-28

## 2018-09-21 NOTE — PRE-PROCEDURE INSTRUCTIONS
Pre-Surgery Instructions:   Medication Instructions    acetaminophen (TYLENOL) 325 mg tablet Patient was instructed by Physician and understands   naproxen (NAPROSYN) 500 mg tablet Patient was instructed by Physician and understands   Probiotic Product (PROBIOTIC PO) Patient was instructed by Physician and understands   traMADol (ULTRAM) 50 mg tablet Patient was instructed by Physician and understands  Patient seen by Dr Yolande Summers instructed no medication needed the morning of surgery  Patient given/ instructed on use of chlorhexidine soap per hospital protocol    Patient instructed to stop all ASA, NSAIDS, vitamins and herbal supplements one week prior to surgery

## 2018-09-21 NOTE — ANESTHESIA PREPROCEDURE EVALUATION
Review of Systems/Medical History  Patient summary reviewed  Chart reviewed  No history of anesthetic complications     Cardiovascular    Comment: ASD repair 2004  ,  Pulmonary  Smoker ex-smoker  Cumulative Pack Years: 3,        GI/Hepatic  Negative GI/hepatic ROS          Negative  ROS        Endo/Other  Negative endo/other ROS      GYN       Hematology  Negative hematology ROS      Musculoskeletal  Negative musculoskeletal ROS        Neurology      Comment: Raynaud's disease Psychology   Anxiety,              Physical Exam    Airway    Mallampati score: I  TM Distance: >3 FB  Neck ROM: full     Dental   No notable dental hx     Cardiovascular  Rhythm: regular, Rate: normal, Cardiovascular exam normal    Pulmonary  Pulmonary exam normal Breath sounds clear to auscultation,     Other Findings        Anesthesia Plan  ASA Score- 2     Anesthesia Type- general with ASA Monitors  Additional Monitors:   Airway Plan:         Plan Factors-    Induction- intravenous  Postoperative Plan-     Informed Consent- Anesthetic plan and risks discussed with patient

## 2018-09-28 ENCOUNTER — ANESTHESIA (OUTPATIENT)
Dept: PERIOP | Facility: HOSPITAL | Age: 33
End: 2018-09-28
Payer: COMMERCIAL

## 2018-09-28 ENCOUNTER — HOSPITAL ENCOUNTER (OUTPATIENT)
Facility: HOSPITAL | Age: 33
Setting detail: OUTPATIENT SURGERY
Discharge: HOME | End: 2018-09-28
Attending: SURGERY | Admitting: SURGERY
Payer: COMMERCIAL

## 2018-09-28 VITALS
TEMPERATURE: 97.6 F | RESPIRATION RATE: 16 BRPM | HEART RATE: 62 BPM | SYSTOLIC BLOOD PRESSURE: 98 MMHG | DIASTOLIC BLOOD PRESSURE: 53 MMHG | OXYGEN SATURATION: 100 % | WEIGHT: 138 LBS | BODY MASS INDEX: 20.44 KG/M2 | HEIGHT: 69 IN

## 2018-09-28 DIAGNOSIS — D24.2 FIBROADENOMA OF BREAST, LEFT: ICD-10-CM

## 2018-09-28 LAB — EXT PREGNANCY TEST URINE: NEGATIVE

## 2018-09-28 PROCEDURE — 88305 TISSUE EXAM BY PATHOLOGIST: CPT | Performed by: PATHOLOGY

## 2018-09-28 PROCEDURE — 81025 URINE PREGNANCY TEST: CPT | Performed by: ANESTHESIOLOGY

## 2018-09-28 PROCEDURE — 19120 REMOVAL OF BREAST LESION: CPT | Performed by: SURGERY

## 2018-09-28 RX ORDER — SODIUM CHLORIDE 9 MG/ML
125 INJECTION, SOLUTION INTRAVENOUS CONTINUOUS
Status: DISCONTINUED | OUTPATIENT
Start: 2018-09-28 | End: 2018-09-28 | Stop reason: HOSPADM

## 2018-09-28 RX ORDER — IBUPROFEN 600 MG/1
600 TABLET ORAL EVERY 6 HOURS PRN
Status: DISCONTINUED | OUTPATIENT
Start: 2018-09-28 | End: 2018-09-28 | Stop reason: HOSPADM

## 2018-09-28 RX ORDER — FENTANYL CITRATE 50 UG/ML
INJECTION, SOLUTION INTRAMUSCULAR; INTRAVENOUS AS NEEDED
Status: DISCONTINUED | OUTPATIENT
Start: 2018-09-28 | End: 2018-09-28 | Stop reason: SURG

## 2018-09-28 RX ORDER — HYDROMORPHONE HCL 110MG/55ML
0.5 PATIENT CONTROLLED ANALGESIA SYRINGE INTRAVENOUS
Status: DISCONTINUED | OUTPATIENT
Start: 2018-09-28 | End: 2018-09-28 | Stop reason: HOSPADM

## 2018-09-28 RX ORDER — MAGNESIUM HYDROXIDE 1200 MG/15ML
LIQUID ORAL AS NEEDED
Status: DISCONTINUED | OUTPATIENT
Start: 2018-09-28 | End: 2018-09-28 | Stop reason: HOSPADM

## 2018-09-28 RX ORDER — MIDAZOLAM HYDROCHLORIDE 1 MG/ML
INJECTION INTRAMUSCULAR; INTRAVENOUS AS NEEDED
Status: DISCONTINUED | OUTPATIENT
Start: 2018-09-28 | End: 2018-09-28 | Stop reason: SURG

## 2018-09-28 RX ORDER — FENTANYL CITRATE/PF 50 MCG/ML
25 SYRINGE (ML) INJECTION
Status: DISCONTINUED | OUTPATIENT
Start: 2018-09-28 | End: 2018-09-28 | Stop reason: HOSPADM

## 2018-09-28 RX ORDER — DEXAMETHASONE SODIUM PHOSPHATE 4 MG/ML
INJECTION, SOLUTION INTRA-ARTICULAR; INTRALESIONAL; INTRAMUSCULAR; INTRAVENOUS; SOFT TISSUE AS NEEDED
Status: DISCONTINUED | OUTPATIENT
Start: 2018-09-28 | End: 2018-09-28 | Stop reason: SURG

## 2018-09-28 RX ORDER — ONDANSETRON 2 MG/ML
4 INJECTION INTRAMUSCULAR; INTRAVENOUS ONCE
Status: DISCONTINUED | OUTPATIENT
Start: 2018-09-28 | End: 2018-09-28 | Stop reason: HOSPADM

## 2018-09-28 RX ORDER — KETOROLAC TROMETHAMINE 30 MG/ML
INJECTION, SOLUTION INTRAMUSCULAR; INTRAVENOUS AS NEEDED
Status: DISCONTINUED | OUTPATIENT
Start: 2018-09-28 | End: 2018-09-28 | Stop reason: SURG

## 2018-09-28 RX ORDER — CEFAZOLIN SODIUM 1 G/3ML
INJECTION, POWDER, FOR SOLUTION INTRAMUSCULAR; INTRAVENOUS AS NEEDED
Status: DISCONTINUED | OUTPATIENT
Start: 2018-09-28 | End: 2018-09-28

## 2018-09-28 RX ORDER — PROPOFOL 10 MG/ML
INJECTION, EMULSION INTRAVENOUS AS NEEDED
Status: DISCONTINUED | OUTPATIENT
Start: 2018-09-28 | End: 2018-09-28 | Stop reason: SURG

## 2018-09-28 RX ORDER — ONDANSETRON 2 MG/ML
INJECTION INTRAMUSCULAR; INTRAVENOUS AS NEEDED
Status: DISCONTINUED | OUTPATIENT
Start: 2018-09-28 | End: 2018-09-28 | Stop reason: SURG

## 2018-09-28 RX ORDER — TRAMADOL HYDROCHLORIDE 50 MG/1
50 TABLET ORAL EVERY 6 HOURS PRN
Status: DISCONTINUED | OUTPATIENT
Start: 2018-09-28 | End: 2018-09-28 | Stop reason: HOSPADM

## 2018-09-28 RX ORDER — BUPIVACAINE HYDROCHLORIDE 5 MG/ML
INJECTION, SOLUTION PERINEURAL AS NEEDED
Status: DISCONTINUED | OUTPATIENT
Start: 2018-09-28 | End: 2018-09-28 | Stop reason: HOSPADM

## 2018-09-28 RX ADMIN — FENTANYL CITRATE 50 MCG: 50 INJECTION, SOLUTION INTRAMUSCULAR; INTRAVENOUS at 08:34

## 2018-09-28 RX ADMIN — FENTANYL CITRATE 25 MCG: 50 INJECTION INTRAMUSCULAR; INTRAVENOUS at 09:02

## 2018-09-28 RX ADMIN — SODIUM CHLORIDE: 0.9 INJECTION, SOLUTION INTRAVENOUS at 08:31

## 2018-09-28 RX ADMIN — FENTANYL CITRATE 25 MCG: 50 INJECTION, SOLUTION INTRAMUSCULAR; INTRAVENOUS at 07:55

## 2018-09-28 RX ADMIN — MIDAZOLAM 2 MG: 1 INJECTION INTRAMUSCULAR; INTRAVENOUS at 07:27

## 2018-09-28 RX ADMIN — KETOROLAC TROMETHAMINE 30 MG: 30 INJECTION, SOLUTION INTRAMUSCULAR at 08:14

## 2018-09-28 RX ADMIN — IBUPROFEN 600 MG: 600 TABLET, FILM COATED ORAL at 10:51

## 2018-09-28 RX ADMIN — PROPOFOL 150 MG: 10 INJECTION, EMULSION INTRAVENOUS at 07:46

## 2018-09-28 RX ADMIN — SODIUM CHLORIDE 125 ML/HR: 0.9 INJECTION, SOLUTION INTRAVENOUS at 07:24

## 2018-09-28 RX ADMIN — DEXAMETHASONE SODIUM PHOSPHATE 4 MG: 4 INJECTION, SOLUTION INTRAMUSCULAR; INTRAVENOUS at 07:45

## 2018-09-28 RX ADMIN — LIDOCAINE HYDROCHLORIDE 80 MG: 20 INJECTION, SOLUTION INTRAVENOUS at 07:46

## 2018-09-28 RX ADMIN — ONDANSETRON HYDROCHLORIDE 4 MG: 2 INJECTION, SOLUTION INTRAVENOUS at 07:27

## 2018-09-28 RX ADMIN — CEFAZOLIN SODIUM 1000 MG: 1 SOLUTION INTRAVENOUS at 07:47

## 2018-09-28 RX ADMIN — FENTANYL CITRATE 25 MCG: 50 INJECTION, SOLUTION INTRAMUSCULAR; INTRAVENOUS at 07:49

## 2018-09-28 NOTE — H&P (VIEW-ONLY)
Surgical Oncology Follow Up       [facility]  [facility] [place]  [address] [facility] [address]    Chaparrita PENG [facility]  1985  [id number]  [address]  [facility] [place]  [address] [place] [address]    Chief Complaint   Patient presents with    Breast Problem     Pt is here for follow up        Assessment/Plan   Diagnoses and all orders for this visit:    Fibroadenoma of breast, left  -     traMADol (ULTRAM) 50 mg tablet; Take 1 tablet (50 mg total) by mouth every 6 (six) hours as needed for moderate pain    Other orders  -     ceFAZolin (ANCEF) IVPB (premix) 1,000 mg; Infuse 50 mL (1,000 mg total) into a venous catheter once         Advance Care Planning/Advance Directives:  Did not discuss  with the patient  Oncology History:     No history exists  History of Present Illness: follow up  -Interval History: occasional right sided breast pain likely secondary to scar tissue     Review of Systems:  Review of Systems   Constitutional: Negative  Negative for appetite change and fever  Eyes: Negative  Respiratory: Negative for shortness of breath  Cardiovascular: Negative  Gastrointestinal: Negative  Endocrine: Negative  Genitourinary: Negative  Musculoskeletal: Negative  Negative for arthralgias and myalgias  Skin: Negative  Allergic/Immunologic: Negative  Neurological: Negative  Hematological: Negative  Negative for adenopathy  Does not bruise/bleed easily  Psychiatric/Behavioral: Negative          Patient Active Problem List   Diagnosis    Dysplasia of cervix, high grade WYATT 2     (spontaneous vaginal delivery)    Anxiety state    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    Type O blood, 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    Depressive disorder    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    Mastodynia, female    Fibroadenoma of breast, left     Past Medical History:   Diagnosis Date    Anxiety     Depression     Irregular heart beat      Past Surgical History:   Procedure Laterality Date    ASD REPAIR      BREAST SURGERY Bilateral     implants and removal    CARDIAC ELECTROPHYSIOLOGY STUDY AND ABLATION      W[place] COLPOSCOPY,CERVIX W/ADJ VAG,W/LOOP BX N/A 6/8/2017    Procedure: BIOPSY LEEP CERVIX;  Surgeon: Paresh Colin DO;  Location: [facility];  Service: Gynecology     No family history on file  Social History     Social History    Marital status: Single     Spouse name: N/A    Number of children: N/A    Years of education: N/A     Occupational History    Not on file       Social History Main Topics    Smoking status: Former Smoker     Quit date: 5/29/2017    Smokeless tobacco: Never Used    Alcohol use Yes      Comment: occ    Drug use: No    Sexual activity: Not on file     Other Topics Concern    Not on file     Social History Narrative    No narrative on file       Current Outpatient Prescriptions:     Probiotic Product (PROBIOTIC PO), Take by mouth, Disp: , Rfl:     acetaminophen (TYLENOL) 325 mg tablet, Take 1 tablet (325 mg total) by mouth every 4 (four) hours as needed for mild pain (Patient not taking: Reported on 9/17/2018 ), Disp: 30 tablet, Rfl: 0    ibuprofen (MOTRIN) 200 mg tablet, Take 200 mg by mouth every 6 (six) hours as needed for mild pain, Disp: , Rfl:     naproxen (NAPROSYN) 500 mg tablet, Take 1 tablet (500 mg total) by mouth 2 (two) times a day with meals (Patient not taking: Reported on 9/17/2018 ), Disp: 15 tablet, Rfl: 0    traMADol (ULTRAM) 50 mg tablet, Take 1 tablet (50 mg total) by mouth every 6 (six) hours as needed for moderate pain, Disp: 20 tablet, Rfl: 0  Allergies   Allergen Reactions    Adhesive [Medical Tape] Rash    Other Other (See Comments)     contrast dye  contrast dye       The following portions of the patient's history were reviewed and updated as appropriate: allergies, current medications, past family history, past medical history, past social history, past surgical history and problem list         Vitals:    09/17/18 1529   BP: 100/70   Pulse: 66   Resp: 16   Temp: 97 9 °F (36 6 °C)       Physical Exam   Constitutional: She is oriented to person, place, and time  She appears well-developed and well-nourished  HENT:   Head: Normocephalic and atraumatic  Cardiovascular: Normal heart sounds  Pulmonary/Chest: Breath sounds normal  Right breast exhibits no inverted nipple, no mass, no nipple discharge, no skin change and no tenderness  Left breast exhibits mass  Left breast exhibits no inverted nipple, no nipple discharge, no skin change and no tenderness  Abdominal: Soft  Lymphadenopathy:        Right axillary: No pectoral and no lateral adenopathy present  Left axillary: No pectoral and no lateral adenopathy present  Right: No supraclavicular adenopathy present  Left: No supraclavicular adenopathy present  Neurological: She is alert and oriented to person, place, and time  Psychiatric: She has a normal mood and affect  Discussion/Summary:  36-year-old female here today for a follow-up visit secondary to a large fibroadenoma of the left breast   This had doubled approximately in size  She had wanted surgical excision; however, she developed a severe infection and abscess of the left breast while nursing  This has since resolved  She continues to desire surgical excision  I discussed this procedure with her and answered all of her questions  She also reports occasional pain on the right side that is likely secondary to prior augmentation and removal  I made supportive recommendations for this as well  Consent was signed today in the office and she will be scheduled for excisional biopsy of the left breast in the near term

## 2018-09-28 NOTE — DISCHARGE INSTRUCTIONS
POST-OPERATIVE CARE INSTRUCTIONS       Care after your procedure:   General  · Rest and relax for 24 hours, then gradually return to normal activities  · Do not preform any heavy lifting or strenuous physical activities for 14 days  · Your activity restrictions will be re-evaluated at your post op visit  · Drink clear liquids until you are certain there is no nausea, then resume a normal diet  · Do not drink alcohol, drive any vehicle, operate mechanical equipment or make critical decisions for at least 24 hours and until you are off any narcotic pain medications  The Incision  · Your incision is closed with:   dissolvable stiches just underneath the skin  · The incision is also covered with:                          clear waterproof glue  · A gauze-pad is covering the wound  Wound care  · Remove your gauze-pad after 24 hours  · You may then shower using soap and water to clean your incision  Gently dry the wound  · You may redress your wound with additional gauze and tape if you choose  · A little bruising at the wound site is normal     Medication  · Resume all previous medications  · Take either Naproxen (Aleve) one tablet every 8 hours or Ibuprofen(Advil/Motrin) one(1) to two(2) tablets every 6 hours around the clock for the first 2-3 days  Take this even if you don't think you need it for at least the first 24 hours  · Pain Medication Instructions: as prescribed          Other (If applicable)  · Wear a post-surgical bra around the clock  · May use ice to the incision site(s) for the next 24-48 hours, twice daily     Call your  doctor if you have any of the following:  · Redness, swelling, heat, drainage, and/or bleeding from your wound  · Chills or fever ( above 101' F )  · Pain, not relieved with the above medications  · If you have any questions or problems call our office [phone]    Follow-up appointment:  · As scheduled

## 2018-09-28 NOTE — OP NOTE
OPERATIVE REPORT  PATIENT NAME: Pro Brown    :  1985  MRN: [id number]  Pt Location: [facility] OR ROOM 07    SURGERY DATE: 2018    Surgeon(s) and Role:     Abram Bower MD - Primary    Preop Diagnosis:  Fibroadenoma of breast, left [D24 2]    Post-Op Diagnosis Codes:     * Fibroadenoma of breast, left [D24 2]    Procedure(s) (LRB):  Excisional breast biopsy (Left)    Specimen(s):  ID Type Source Tests Collected by Time Destination   1 : Left breast mass Tissue Breast, Left TISSUE EXAM Delano Velazquez MD 2018 [id number]        Estimated Blood Loss:   Minimal    Drains:       Anesthesia Type:   General    Operative Indications:  Fibroadenoma of breast, left [D24 2]      Operative Findings:  n/a    Complications:   None    Procedure and Technique:  Jack Pedraza is a 55-year-old female who had a known fibroadenoma of the left breast   This had increased in size and she desired surgical excision  She then developed a lactational mastitis and abscess  She then re-presented following that for surgical excision  She presented the day of surgery to the operating room  She had general anesthesia  She had 1 g IV Ancef for antibiotic prophylaxis  She was prepped and draped in the usual standard fashion  Time-out was performed  Attention was turned to the three o'clock periareolar axis of the left breast   0 5% Marcaine plain, 10 cc was used for supplemental anesthesia  A circumareolar incision was created through the skin and subcutaneous tissue  Electrocautery was used to dissect through the breast parenchyma down to the level of the palpable mass  This was elevated into the wound using an Allis clamp  This was excised completely with a small rim of normal glandular tissue  This was sent to pathology in formalin  Her wound was irrigated and hemostasis was achieved  The wound was then closed in layered fashion using interrupted 3-0 Monocryl suture and a running 4-0 Monocryl subcuticular stitch    The skin was cleaned and dried  Surgical glue, fluffs and a bra were applied  The patient was then extubated and taken recovery in stable condition  All counts were correct        Patient Disposition:  extubated and stable    SIGNATURE: Stephanie Barrios MD  DATE: September 28, 2018  TIME: 8:33 AM

## 2018-09-28 NOTE — PROGRESS NOTES
Unable to obtain iv site 2 attempts r hand and r wrist + blood return, sites infiltrated, pt tolerated well

## 2018-10-16 ENCOUNTER — OFFICE VISIT (OUTPATIENT)
Dept: SURGICAL ONCOLOGY | Facility: CLINIC | Age: 33
End: 2018-10-16

## 2018-10-16 VITALS
WEIGHT: 139 LBS | SYSTOLIC BLOOD PRESSURE: 100 MMHG | BODY MASS INDEX: 20.59 KG/M2 | RESPIRATION RATE: 18 BRPM | TEMPERATURE: 97.4 F | HEIGHT: 69 IN | DIASTOLIC BLOOD PRESSURE: 70 MMHG | HEART RATE: 69 BPM

## 2018-10-16 DIAGNOSIS — D24.2 FIBROADENOMA OF BREAST, LEFT: Primary | ICD-10-CM

## 2018-10-16 PROCEDURE — 99024 POSTOP FOLLOW-UP VISIT: CPT | Performed by: SURGERY

## 2018-10-16 NOTE — PROGRESS NOTES
33 y o  female is here today s/p left breast excisional biopsy on 09/28/18  She experiences mild discomfort at surgical site  Her incision line is dry and intact  Physical Exam   Constitutional: She is oriented to person, place, and time  She appears well-developed and well-nourished  Pulmonary/Chest: Left breast exhibits skin change (well healing incision with no signs of infection)  Left breast exhibits no tenderness  There is no breast swelling  Neurological: She is alert and oriented to person, place, and time  Psychiatric: She has a normal mood and affect  Data:     09/28/2018 left breast excisional biopsy shows a fibroadenoma as well as fat necrosis; the fat necrosis is probably secondary to her prior abscess treatment      Diagnoses and all orders for this visit:    Fibroadenoma of breast, left    Other orders  -     Ibuprofen (MOTRIN) 40 MG/ML SUSP; PRN        Assessment/Plan:   28-year-old female who had initially a left breast abscess treated by me with an incision and drainage  She then re-presented for the increasing fibroadenoma in the left breast and recently had that excised  Her pathology is benign  She only has one maternal grandmother who had breast cancer in her [de-identified]  I will therefore see her on an as-needed basis at this point

## 2018-10-16 NOTE — LETTER
October 16, 2018     GingerDO Kvng Gonzalez [facility]  [address]  [address]    Patient: Pedro Pablo Driver   YOB: 1985   Date of Visit: 10/16/2018       Dear Dr Edilberto Lopez: Thank you for referring Oscar Coronel to me for evaluation  Below are my notes for this consultation  If you have questions, please do not hesitate to call me  I look forward to following your patient along with you  Sincerely,        Silvia Gamble MD        CC: No Recipients  Silvia Gamble MD  10/16/2018  8:47 AM  Sign at close encounter  33 y o  female is here today s/p left breast excisional biopsy on 09/28/18  She experiences mild discomfort at surgical site  Her incision line is dry and intact  Physical Exam   Constitutional: She is oriented to person, place, and time  She appears well-developed and well-nourished  Pulmonary/Chest: Left breast exhibits skin change (well healing incision with no signs of infection)  Left breast exhibits no tenderness  There is no breast swelling  Neurological: She is alert and oriented to person, place, and time  Psychiatric: She has a normal mood and affect  Data:     09/28/2018 left breast excisional biopsy shows a fibroadenoma as well as fat necrosis; the fat necrosis is probably secondary to her prior abscess treatment      Diagnoses and all orders for this visit:    Fibroadenoma of breast, left    Other orders  -     Ibuprofen (MOTRIN) 40 MG/ML SUSP; PRN        Assessment/Plan:   36-year-old female who had initially a left breast abscess treated by me with an incision and drainage  She then re-presented for the increasing fibroadenoma in the left breast and recently had that excised  Her pathology is benign  She only has one maternal grandmother who had breast cancer in her [de-identified]  I will therefore see her on an as-needed basis at this point

## 2019-08-07 ENCOUNTER — TRANSCRIBE ORDERS (OUTPATIENT)
Dept: ADMINISTRATIVE | Facility: HOSPITAL | Age: 34
End: 2019-08-07

## 2019-08-07 DIAGNOSIS — R59.9 SWELLING OF LYMPH NODES: Primary | ICD-10-CM

## 2019-08-09 ENCOUNTER — HOSPITAL ENCOUNTER (OUTPATIENT)
Dept: RADIOLOGY | Age: 34
Discharge: HOME | End: 2019-08-09
Payer: COMMERCIAL

## 2019-08-09 DIAGNOSIS — R59.9 SWELLING OF LYMPH NODES: ICD-10-CM

## 2019-08-09 PROCEDURE — 76536 US EXAM OF HEAD AND NECK: CPT

## 2019-11-11 PROBLEM — R59.0 ADENOPATHY, CERVICAL: Status: ACTIVE | Noted: 2019-11-11

## 2019-12-02 ENCOUNTER — TRANSCRIBE ORDERS (OUTPATIENT)
Dept: RADIOLOGY | Facility: HOSPITAL | Age: 34
End: 2019-12-02

## 2019-12-02 ENCOUNTER — HOSPITAL ENCOUNTER (OUTPATIENT)
Dept: RADIOLOGY | Facility: HOSPITAL | Age: 34
Discharge: HOME | End: 2019-12-02
Attending: SURGERY | Admitting: SURGERY
Payer: COMMERCIAL

## 2019-12-02 DIAGNOSIS — R59.0 ADENOPATHY, CERVICAL: ICD-10-CM

## 2019-12-02 PROCEDURE — 88112 CYTOPATH CELL ENHANCE TECH: CPT | Performed by: PATHOLOGY

## 2019-12-02 PROCEDURE — 10005 FNA BX W/US GDN 1ST LES: CPT

## 2019-12-02 RX ORDER — LIDOCAINE HYDROCHLORIDE 10 MG/ML
2 INJECTION, SOLUTION INFILTRATION; PERINEURAL ONCE
Status: COMPLETED | OUTPATIENT
Start: 2019-12-02 | End: 2019-12-02

## 2019-12-02 RX ADMIN — LIDOCAINE HYDROCHLORIDE 2 ML: 10 INJECTION, SOLUTION INFILTRATION; PERINEURAL at 11:59

## 2019-12-10 NOTE — RESULT ENCOUNTER NOTE
Discussed results with the patient  The adenopathy over the cervical region do not correspond to this hard firm fixed nodule against the angle of the mandible  Ultrasound is not assess or this could be  Needle biopsy was obtained revealing a cellular component  There is no image core lead to date demonstrate this nodular mass  I recommended a CT scan of the neck soft tissues with IV contrast   Apparently this was rejected by her insurance company previously  Will retrial for permission

## 2019-12-19 ENCOUNTER — HOSPITAL ENCOUNTER (OUTPATIENT)
Dept: RADIOLOGY | Facility: HOSPITAL | Age: 34
Discharge: HOME | End: 2019-12-19
Attending: SURGERY
Payer: COMMERCIAL

## 2019-12-19 DIAGNOSIS — R22.1 NECK MASS: ICD-10-CM

## 2019-12-19 PROCEDURE — 70491 CT SOFT TISSUE NECK W/DYE: CPT

## 2019-12-19 RX ADMIN — IOHEXOL 85 ML: 350 INJECTION, SOLUTION INTRAVENOUS at 18:03

## 2021-07-26 ENCOUNTER — HOSPITAL ENCOUNTER (OUTPATIENT)
Dept: RADIOLOGY | Facility: HOSPITAL | Age: 36
Discharge: HOME | End: 2021-07-26
Attending: OTOLARYNGOLOGY
Payer: COMMERCIAL

## 2021-07-26 DIAGNOSIS — J32.0 RIGHT MAXILLARY SINUSITIS, CHRONIC: ICD-10-CM

## 2021-07-26 DIAGNOSIS — R51.9 RECURRENT HEADACHE: ICD-10-CM

## 2021-07-26 DIAGNOSIS — Z87.09 HISTORY OF SINUSITIS: ICD-10-CM

## 2021-07-26 PROCEDURE — 70486 CT MAXILLOFACIAL W/O DYE: CPT

## 2021-07-26 PROCEDURE — G1004 CDSM NDSC: HCPCS

## 2021-10-11 PROBLEM — F40.10 SOCIAL ANXIETY DISORDER: Status: ACTIVE | Noted: 2020-12-08

## 2021-10-11 PROBLEM — F33.1 MODERATE EPISODE OF RECURRENT MAJOR DEPRESSIVE DISORDER (HCC): Status: ACTIVE | Noted: 2020-12-08

## 2021-10-11 PROBLEM — F41.1 GENERALIZED ANXIETY DISORDER: Status: ACTIVE | Noted: 2020-12-08

## 2022-01-31 ENCOUNTER — TELEPHONE (OUTPATIENT)
Dept: PERINATAL CARE | Facility: CLINIC | Age: 37
End: 2022-01-31

## 2022-01-31 NOTE — TELEPHONE ENCOUNTER
Received message from patient regarding obtaining records from her previous pregnancy  Telephone call to patient  Confirmed date of birth  Patient stated that she contacted medical records and was told that they cannot provide her the records because they are listed as in process  Explained to the patient that those records are likely in our former electronic medical record system ([employer]) and not able to be viewed in epic because not all records successfully transferred over and the conversion  I advised the patient that I would request ITP to reinstate me in [employer] so that I could review those records and that this might take a while  Patient stated that it is not urgent  She is not planning a future pregnancy but would like the records for consideration of testing her older daughter  Her older daughter is presently 15  I advised the patient that we do recommend testing for carrier status of genetic conditions once an individual becomes of reproductive age  Advised the patient I would be in touch once I am able to access the records

## 2022-03-01 ENCOUNTER — TELEPHONE (OUTPATIENT)
Dept: PERINATAL CARE | Facility: CLINIC | Age: 37
End: 2022-03-01

## 2022-03-01 NOTE — TELEPHONE ENCOUNTER
Patient left message on genetic counseling voicemail asking about status of genetic medical records she asked Ronda Mei, MS, CGC to obtain from her prior pregnancy  Did not see any further notes from Steffany Moctezuma or records scanned in her chart  Left message for patient letting her know that Steffany Moctezuma will follow up with her tomorrow on status

## 2022-03-02 ENCOUNTER — TELEPHONE (OUTPATIENT)
Dept: PERINATAL CARE | Facility: CLINIC | Age: 37
End: 2022-03-02

## 2022-03-07 ENCOUNTER — TELEPHONE (OUTPATIENT)
Dept: PERINATAL CARE | Facility: CLINIC | Age: 37
End: 2022-03-07

## 2022-03-07 NOTE — TELEPHONE ENCOUNTER
Telephone call telephone patient  Confirm date of birth  Advised patient that I was able to review her prior medical records which confirm that her younger daughter is not a carrier of the chromosome translocation or of Fragile X based on results of amniocentesis performed in her pregnancy with that daughter  Advise the patient that my note indicated that the chromosome results that she had provided from testing during her mother's pregnancy with her indicated that there was a translocation between chromosomes 2;12 but break points were not able to be distinguished at that time  Patient stated that she would like a copy of the records  Advised her that I would need to confirm with my  how that process needs to occur  Patient is still considering getting testing for her older daughter  Advised the patient I would be happy to meet with her or she could request testing to her pediatrician  Patient to further consider

## 2023-03-20 ENCOUNTER — TELEPHONE (OUTPATIENT)
Dept: PERINATAL CARE | Facility: CLINIC | Age: 38
End: 2023-03-20

## 2023-03-20 NOTE — TELEPHONE ENCOUNTER
Received call back from [facility], she called [facility] do not have report available  Explained specimen was a send out to Anaya Aguilar will discuss w/ her manager  Phone call to [facility], s/w Gabriela Factor reports from 9/23/2017 received to [facility]ELISSA office via fax and forward scanned to patient's Epic chart  Phone call to patient, left M her amnio reports have been obtained from [facility] and she should be able to see these in Epic ( about 24 hours to complete scan to chart)  Lionel esparza

## 2023-03-20 NOTE — TELEPHONE ENCOUNTER
Stanislav Garrett s/w [facility] CSA Rachael : patient states she has been waiting for follow up call from [facility] about her medical records, she states she wants to speak with manager b/c she called  [facility] few weeks ago and was told they would look into her request and call her back but she is frustrated b/c she has not heard anything  I s/w patient,  she states she also called the [address] [place] [address] office, MRO  told patient the records she needs have not been released by [facility] office  Upon [facility] review VALERIANOOSWALDO used [employer] EMR 2017   1 report under Lab shows " Miscellaneous lab test" date 9/27/217 , status of order is Active- in progress? Patient states she had been in contact with Genetic Counsellor Juan Fuentes multiple times in 2022 to obtain these amnio results but has had no follow up  Apologized to patient for her experience, thanked her for bringing this issue to our attention  Explained the Genetic Counselor ( Juan Fuentes) is no longer with the [address] [place] [address] and that I would f/u with the current counselor as well as assured patient I would f/u with [facility] managers about her experience as she requested  Explained [facility] staff do document all patient calls in the EMR but unfortunately I could not see any recently documented by [facility] staff ( patient states she will check her notes for whom she s/w)   Reviewed all her encounters from 2017 and together we 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 with Dr Franco Toro but the 2017 amnio report is not available to view in [address] [place] [address] Rd  Offered patient that I would f/u with [address] [place] [address] to check in [employer] and if no amnio report is available [facility] will f/u with 3rd party lab that probably ran the  amnio ( Frederic Cortés)  Explained to plained to patient [facility] lab does not run the amnio samples and therefore this report would not be in the LAB section but a copy of scanned report may have been sent (back in 2017 ) and this report may be available to be sent to [facility]  Patient verbalized understanding    Phone call to [facility] ( [phone]) s/w Caitlin, she will review if Evan has an amnio report from 2017, has my direct # to call back  Reviewed above w/ M ADIEL Moreno Ser, if no response from [facility], [facility] will try to obtain through [facility]   Call routed to [facility] managers for review

## 2024-03-21 DIAGNOSIS — Z00.6 ENCOUNTER FOR EXAMINATION FOR NORMAL COMPARISON OR CONTROL IN CLINICAL RESEARCH PROGRAM: ICD-10-CM

## 2024-04-11 ENCOUNTER — APPOINTMENT (OUTPATIENT)
Dept: LAB | Age: 39
End: 2024-04-11

## 2024-04-11 DIAGNOSIS — Z00.6 ENCOUNTER FOR EXAMINATION FOR NORMAL COMPARISON OR CONTROL IN CLINICAL RESEARCH PROGRAM: ICD-10-CM

## 2024-04-11 PROCEDURE — 36415 COLL VENOUS BLD VENIPUNCTURE: CPT

## 2024-04-28 LAB
APOB+LDLR+PCSK9 GENE MUT ANL BLD/T: NOT DETECTED
BRCA1+BRCA2 DEL+DUP + FULL MUT ANL BLD/T: NOT DETECTED
MLH1+MSH2+MSH6+PMS2 GN DEL+DUP+FUL M: NOT DETECTED

## (undated) DEVICE — SUT MONOCRYL 3-0 SH 27 IN Y416H

## (undated) DEVICE — SCD SEQUENTIAL COMPRESSION COMFORT SLEEVE MEDIUM KNEE LENGTH: Brand: KENDALL SCD

## (undated) DEVICE — PREMIUM DRY TRAY LF: Brand: MEDLINE INDUSTRIES, INC.

## (undated) DEVICE — GLOVE SRG BIOGEL 6

## (undated) DEVICE — ASTOUND STANDARD SURGICAL GOWN, XXL: Brand: CONVERTORS

## (undated) DEVICE — SUT MONOCRYL 4-0 PS-2 27 IN Y426H

## (undated) DEVICE — MEDI-VAC YANKAUER SUCTION HANDLE W/BULBOUS AND CONTROL VENT: Brand: CARDINAL HEALTH

## (undated) DEVICE — TUNGSTEN LOOP ELECTRODE: Brand: VALLEYLAB

## (undated) DEVICE — 2000CC GUARDIAN II: Brand: GUARDIAN

## (undated) DEVICE — TUBING SUCTION 5MM X 12 FT

## (undated) DEVICE — CAUTERY TIP POLISHER: Brand: DEVON

## (undated) DEVICE — Device

## (undated) DEVICE — BETHLEHEM UNIVERSAL MINOR GEN: Brand: CARDINAL HEALTH

## (undated) DEVICE — BUTTON SWITCH PENCIL HOLSTER: Brand: VALLEYLAB

## (undated) DEVICE — REM POLYHESIVE ADULT PATIENT RETURN ELECTRODE: Brand: VALLEYLAB

## (undated) DEVICE — BRA SURGICAL SZ MED (33-36)

## (undated) DEVICE — GLOVE INDICATOR PI UNDERGLOVE SZ 6.5 BLUE

## (undated) DEVICE — INTENDED FOR TISSUE SEPARATION, AND OTHER PROCEDURES THAT REQUIRE A SHARP SURGICAL BLADE TO PUNCTURE OR CUT.: Brand: BARD-PARKER SAFETY BLADES SIZE 15, STERILE

## (undated) DEVICE — STRL UNIVERSAL MINOR VAGINAL: Brand: CARDINAL HEALTH

## (undated) DEVICE — SUPER SPONGES,MEDIUM: Brand: KERLIX

## (undated) DEVICE — GLOVE SRG BIOGEL 6.5

## (undated) DEVICE — STERILE 8 INCH PROCTO SWAB: Brand: CARDINAL HEALTH

## (undated) DEVICE — PLUMEPEN PRO 10FT

## (undated) DEVICE — GLOVE INDICATOR PI UNDERGLOVE SZ 7 BLUE

## (undated) DEVICE — CHLORAPREP HI-LITE 26ML ORANGE

## (undated) DEVICE — ADHESIVE SKN CLSR HISTOACRYL FLEX 0.5ML LF

## (undated) DEVICE — NEEDLE 25G X 1 1/2

## (undated) DEVICE — LEEP BALL ELECTRODE 5 MM

## (undated) DEVICE — GLOVE SRG BIOGEL ECLIPSE 7

## (undated) DEVICE — GLOVE INDICATOR PI UNDERGLOVE SZ 7.5 BLUE

## (undated) DEVICE — SUT SILK 2-0 SH 30 IN K833H

## (undated) DEVICE — SMOKE EVACUATION TUBING WITH 7/8 IN TO 1/4 IN REDUCER: Brand: BUFFALO FILTER